# Patient Record
Sex: MALE | Race: WHITE | NOT HISPANIC OR LATINO
[De-identification: names, ages, dates, MRNs, and addresses within clinical notes are randomized per-mention and may not be internally consistent; named-entity substitution may affect disease eponyms.]

---

## 2017-09-20 ENCOUNTER — APPOINTMENT (OUTPATIENT)
Dept: ENDOCRINOLOGY | Facility: CLINIC | Age: 67
End: 2017-09-20
Payer: COMMERCIAL

## 2017-09-20 VITALS
TEMPERATURE: 97.9 F | DIASTOLIC BLOOD PRESSURE: 86 MMHG | OXYGEN SATURATION: 97 % | HEIGHT: 70 IN | BODY MASS INDEX: 25.62 KG/M2 | WEIGHT: 179 LBS | HEART RATE: 65 BPM | SYSTOLIC BLOOD PRESSURE: 142 MMHG

## 2017-09-20 DIAGNOSIS — E55.9 VITAMIN D DEFICIENCY, UNSPECIFIED: ICD-10-CM

## 2017-09-20 DIAGNOSIS — N23 UNSPECIFIED RENAL COLIC: ICD-10-CM

## 2017-09-20 DIAGNOSIS — D35.02 BENIGN NEOPLASM OF LEFT ADRENAL GLAND: ICD-10-CM

## 2017-09-20 PROCEDURE — 99214 OFFICE O/P EST MOD 30 MIN: CPT

## 2017-09-21 PROBLEM — E55.9 VITAMIN D DEFICIENCY: Status: ACTIVE | Noted: 2017-09-21

## 2017-09-21 PROBLEM — D35.02 ADRENAL CORTICAL ADENOMA OF LEFT ADRENAL GLAND: Status: ACTIVE | Noted: 2017-09-21

## 2017-09-21 PROBLEM — N23 RENAL COLIC ON RIGHT SIDE: Status: RESOLVED | Noted: 2017-09-21 | Resolved: 2017-09-21

## 2017-10-04 ENCOUNTER — APPOINTMENT (OUTPATIENT)
Dept: OTOLARYNGOLOGY | Facility: CLINIC | Age: 67
End: 2017-10-04
Payer: COMMERCIAL

## 2017-10-04 VITALS — OXYGEN SATURATION: 98 % | DIASTOLIC BLOOD PRESSURE: 77 MMHG | HEART RATE: 67 BPM | SYSTOLIC BLOOD PRESSURE: 136 MMHG

## 2017-10-04 PROCEDURE — 31575 DIAGNOSTIC LARYNGOSCOPY: CPT

## 2017-10-04 PROCEDURE — 92557 COMPREHENSIVE HEARING TEST: CPT

## 2017-10-04 PROCEDURE — 99214 OFFICE O/P EST MOD 30 MIN: CPT | Mod: 25

## 2017-10-04 PROCEDURE — 92550 TYMPANOMETRY & REFLEX THRESH: CPT

## 2017-10-04 PROCEDURE — 69210 REMOVE IMPACTED EAR WAX UNI: CPT

## 2017-10-25 ENCOUNTER — APPOINTMENT (OUTPATIENT)
Dept: OTOLARYNGOLOGY | Facility: CLINIC | Age: 67
End: 2017-10-25
Payer: COMMERCIAL

## 2017-10-25 VITALS
HEART RATE: 61 BPM | SYSTOLIC BLOOD PRESSURE: 149 MMHG | TEMPERATURE: 98.3 F | OXYGEN SATURATION: 97 % | DIASTOLIC BLOOD PRESSURE: 83 MMHG

## 2017-10-25 PROCEDURE — 99214 OFFICE O/P EST MOD 30 MIN: CPT

## 2017-11-08 ENCOUNTER — APPOINTMENT (OUTPATIENT)
Dept: OTOLARYNGOLOGY | Facility: CLINIC | Age: 67
End: 2017-11-08
Payer: COMMERCIAL

## 2017-11-08 VITALS
HEART RATE: 65 BPM | SYSTOLIC BLOOD PRESSURE: 151 MMHG | DIASTOLIC BLOOD PRESSURE: 88 MMHG | OXYGEN SATURATION: 98 % | TEMPERATURE: 98.4 F

## 2017-11-08 PROCEDURE — 99213 OFFICE O/P EST LOW 20 MIN: CPT

## 2017-12-01 ENCOUNTER — APPOINTMENT (OUTPATIENT)
Dept: OTOLARYNGOLOGY | Facility: CLINIC | Age: 67
End: 2017-12-01
Payer: COMMERCIAL

## 2017-12-01 VITALS
OXYGEN SATURATION: 99 % | HEART RATE: 64 BPM | DIASTOLIC BLOOD PRESSURE: 90 MMHG | TEMPERATURE: 98.2 F | SYSTOLIC BLOOD PRESSURE: 180 MMHG

## 2017-12-01 DIAGNOSIS — K21.9 ACUTE LARYNGITIS: ICD-10-CM

## 2017-12-01 DIAGNOSIS — R13.14 DYSPHAGIA, PHARYNGOESOPHAGEAL PHASE: ICD-10-CM

## 2017-12-01 DIAGNOSIS — J04.0 ACUTE LARYNGITIS: ICD-10-CM

## 2017-12-01 DIAGNOSIS — Z87.442 PERSONAL HISTORY OF URINARY CALCULI: ICD-10-CM

## 2017-12-01 PROCEDURE — 99214 OFFICE O/P EST MOD 30 MIN: CPT | Mod: 25

## 2017-12-01 PROCEDURE — 10022: CPT

## 2017-12-01 PROCEDURE — 76942 ECHO GUIDE FOR BIOPSY: CPT

## 2017-12-01 PROCEDURE — 31575 DIAGNOSTIC LARYNGOSCOPY: CPT

## 2017-12-04 LAB
25(OH)D3 SERPL-MCNC: 39.8 NG/ML
CALCIUM SERPL-MCNC: 9.5 MG/DL
PARATHYROID HORMONE INTACT: 41 PG/ML
T4 FREE SERPL-MCNC: 1 NG/DL
THYROGLOB AB SERPL-ACNC: <20 IU/ML
THYROPEROXIDASE AB SERPL IA-ACNC: 19.8 IU/ML
TSH SERPL-ACNC: 1.95 UIU/ML

## 2017-12-08 ENCOUNTER — MEDICATION RENEWAL (OUTPATIENT)
Age: 67
End: 2017-12-08

## 2018-01-02 ENCOUNTER — APPOINTMENT (OUTPATIENT)
Dept: OTOLARYNGOLOGY | Facility: CLINIC | Age: 68
End: 2018-01-02

## 2018-03-26 ENCOUNTER — APPOINTMENT (OUTPATIENT)
Dept: OTOLARYNGOLOGY | Facility: CLINIC | Age: 68
End: 2018-03-26
Payer: COMMERCIAL

## 2018-03-26 VITALS
TEMPERATURE: 98.4 F | HEART RATE: 76 BPM | SYSTOLIC BLOOD PRESSURE: 149 MMHG | OXYGEN SATURATION: 97 % | DIASTOLIC BLOOD PRESSURE: 82 MMHG

## 2018-03-26 PROCEDURE — 99214 OFFICE O/P EST MOD 30 MIN: CPT | Mod: 25

## 2018-03-26 PROCEDURE — 31575 DIAGNOSTIC LARYNGOSCOPY: CPT

## 2018-09-19 ENCOUNTER — APPOINTMENT (OUTPATIENT)
Dept: OTOLARYNGOLOGY | Facility: CLINIC | Age: 68
End: 2018-09-19
Payer: COMMERCIAL

## 2018-09-19 VITALS
DIASTOLIC BLOOD PRESSURE: 80 MMHG | RESPIRATION RATE: 17 BRPM | TEMPERATURE: 98.5 F | OXYGEN SATURATION: 98 % | SYSTOLIC BLOOD PRESSURE: 145 MMHG | HEART RATE: 68 BPM

## 2018-09-19 PROCEDURE — 92550 TYMPANOMETRY & REFLEX THRESH: CPT

## 2018-09-19 PROCEDURE — 69210 REMOVE IMPACTED EAR WAX UNI: CPT

## 2018-09-19 PROCEDURE — 99213 OFFICE O/P EST LOW 20 MIN: CPT | Mod: 25

## 2018-09-19 PROCEDURE — 92557 COMPREHENSIVE HEARING TEST: CPT

## 2018-09-26 ENCOUNTER — APPOINTMENT (OUTPATIENT)
Dept: ENDOCRINOLOGY | Facility: CLINIC | Age: 68
End: 2018-09-26
Payer: COMMERCIAL

## 2018-09-26 VITALS
DIASTOLIC BLOOD PRESSURE: 77 MMHG | HEART RATE: 81 BPM | HEIGHT: 70 IN | OXYGEN SATURATION: 97 % | SYSTOLIC BLOOD PRESSURE: 134 MMHG | BODY MASS INDEX: 25.34 KG/M2 | WEIGHT: 177 LBS | TEMPERATURE: 98.8 F

## 2018-09-26 DIAGNOSIS — R31.29 OTHER MICROSCOPIC HEMATURIA: ICD-10-CM

## 2018-09-26 PROCEDURE — 99214 OFFICE O/P EST MOD 30 MIN: CPT

## 2018-09-26 RX ORDER — OMEPRAZOLE 20 MG/1
20 CAPSULE, DELAYED RELEASE ORAL
Qty: 90 | Refills: 1 | Status: DISCONTINUED | COMMUNITY
Start: 2017-11-03 | End: 2018-09-26

## 2018-09-26 RX ORDER — OMEPRAZOLE 20 MG/1
20 CAPSULE, DELAYED RELEASE ORAL DAILY
Qty: 30 | Refills: 3 | Status: DISCONTINUED | COMMUNITY
Start: 2017-10-04 | End: 2018-09-26

## 2018-09-27 PROBLEM — R31.29 MICROSCOPIC HEMATURIA: Status: ACTIVE | Noted: 2018-09-26

## 2018-12-20 ENCOUNTER — TRANSCRIPTION ENCOUNTER (OUTPATIENT)
Age: 68
End: 2018-12-20

## 2018-12-20 ENCOUNTER — MEDICATION RENEWAL (OUTPATIENT)
Age: 68
End: 2018-12-20

## 2019-03-26 ENCOUNTER — TRANSCRIPTION ENCOUNTER (OUTPATIENT)
Age: 69
End: 2019-03-26

## 2019-04-23 ENCOUNTER — APPOINTMENT (OUTPATIENT)
Dept: OTOLARYNGOLOGY | Facility: CLINIC | Age: 69
End: 2019-04-23
Payer: COMMERCIAL

## 2019-04-23 VITALS
SYSTOLIC BLOOD PRESSURE: 139 MMHG | TEMPERATURE: 98.3 F | OXYGEN SATURATION: 98 % | DIASTOLIC BLOOD PRESSURE: 84 MMHG | HEART RATE: 68 BPM

## 2019-04-23 PROCEDURE — 31575 DIAGNOSTIC LARYNGOSCOPY: CPT

## 2019-04-23 PROCEDURE — 99214 OFFICE O/P EST MOD 30 MIN: CPT | Mod: 25

## 2019-04-23 NOTE — PROCEDURE
[Globus] : globus [Image(s) Captured] : image(s) captured and filed [Topical Lidocaine] : topical lidocaine [Flexible Endoscope] : examined with the flexible endoscope [Serial Number: ___] : Serial Number: [unfilled] [Same] : same as the Pre Op Dx. [Cerumen Impaction] : Cerumen Impaction [Unable to Cooperate with Mirror] : patient unable to cooperate with mirror [Gag Reflex] : gag reflex preventing mirror examination [de-identified] : The nasal septum is minimally deviated to the left with a spur. There are no masses or polyps and the nasal mucosa and secretions are normal. The choanae and posterior nasopharynx are normal without masses or drainage. The Eustachian tube orifices appear patent. The pharynx, including the posterior and lateral pharyngeal walls, the vallecula and base of tongue are normal without ulcerations, lesions or masses. The uvula is thickened and elongated, unchanged form last visit. The hypopharynx including the pyriform sinuses open well without pooling of secretions, mucosal lesions or masses. The supraglottic larynx including the epiglottis, petiole, glossoepiglottic folds and pharyngoepiglottic folds are normal without mucosal lesions, ulcerations or masses. The glottis reveals normal false vocal folds. The true vocal folds are glistening white, tense and of equal length, without paralysis, having symmetric mobility on adduction and abduction. There are no mucosal lesions, nodules, cysts, erythroplasia or leukoplakia. The posterior cricoid area has healthy pink mucosa in the interarytenoid area and esophageal inlet. There is mild thickening/edema of the interarytenoid mucosa suggestive of posterior laryngitis from laryngopharyngeal acid reflux disease. The trachea is clear in the immediate subglottic region without narrowing, deviation or lesions. \par  [de-identified] : snoring, GERD and thyroid nodules.  [FreeTextEntry1] : Bilateral hearing loss  [FreeTextEntry6] : copious cerumen AD removed with curettes and alligator forceps.

## 2019-04-23 NOTE — HISTORY OF PRESENT ILLNESS
[de-identified] : Ranjeet is a generally healthy 67-year-old male semi-retired  who was evaluated by his Otologist for complaints of globus sensation and hyperphlegmia with regurgitation and treated for GERD with omeprazole in October.  However, he was without improvement so a neck MRI was obtained 11/03/17.  This study revealed bilateral thyroid nodules including a solid left lobe 8 mm nodule and a solid 1 cm right lobe nodule not further characterized.  There is no known family history of thyroid disease with thyroid cancer.  He has no known radiation exposures in youth. Currently he denies shortness of breath, significant dysphagia, neck pain, neck pressure, or recent voice changes.  He has not had recent thyroid function studies obtained. His weight has generally been stable although slightly climbing over the years his appetite has been good. \par  [FreeTextEntry1] : Ranjeet returns today after he had a prior  [facility] FNA biopsy from a right mid-upper lobe complex nodule in December 2017.  The cytology was benign, [place] II with benign appearing small follicular cells and thin colloid.  Since then he has not had any new symptoms such as neck pain or pressure.  His weight has been stable and he is euthyroid. He denies dysphagia or recent voice changes.  He had a f/u neck ultrasound this month that revealed bilateral subcentimeter solid nodules.

## 2019-04-23 NOTE — REASON FOR VISIT
[Initial Evaluation] : an initial evaluation for [FreeTextEntry2] : multiple thyroid nodules on past MRI of neck for GERD and globus sensation. S/p FNAB right lobe nodule [FreeTextEntry1] : Referred by González Thomas MD,  PCP is Michael Knight MD

## 2019-04-23 NOTE — CONSULT LETTER
[Dear  ___] : Dear  [unfilled], [Consult Letter:] : I had the pleasure of evaluating your patient, [unfilled]. [Please see my note below.] : Please see my note below. [Sincerely,] : Sincerely, [Consult Closing:] : Thank you very much for allowing me to participate in the care of this patient.  If you have any questions, please do not hesitate to contact me. [Amando Vasquez M.D., FACS, FABIO] : Amando Vasquez M.D., FACS, [facility] [Director, [facility]] : Director, [facility] [[facility]] : [facility]  [[facility]] : [facility] [] :  [Certified in Neck Ultrasound/ ECNU & AIUM] : Certified in Neck Ultrasound/ ECNU & AIUM [Otolaryngology/Head & Neck Surgery] : Otolaryngology/Head & Neck Surgery [[facility]] : [facility]

## 2019-09-24 ENCOUNTER — APPOINTMENT (OUTPATIENT)
Dept: OTOLARYNGOLOGY | Facility: CLINIC | Age: 69
End: 2019-09-24
Payer: COMMERCIAL

## 2019-09-24 VITALS
OXYGEN SATURATION: 97 % | HEART RATE: 72 BPM | DIASTOLIC BLOOD PRESSURE: 76 MMHG | SYSTOLIC BLOOD PRESSURE: 137 MMHG | TEMPERATURE: 98.4 F

## 2019-09-24 PROCEDURE — 69210 REMOVE IMPACTED EAR WAX UNI: CPT

## 2019-09-24 PROCEDURE — 92557 COMPREHENSIVE HEARING TEST: CPT

## 2019-09-24 PROCEDURE — 99213 OFFICE O/P EST LOW 20 MIN: CPT | Mod: 25

## 2019-09-24 PROCEDURE — 92550 TYMPANOMETRY & REFLEX THRESH: CPT

## 2019-09-24 NOTE — HISTORY OF PRESENT ILLNESS
[de-identified] : followup 70 yo man with h/o b snhl has noticed progression of the loss and is ehre to check his ears. wished to discuss hearing aids. denies vertigo or significant tinnitus. does not use qtip. no pain. he feels hl is at the moderate level.

## 2019-09-24 NOTE — PROCEDURE
[Same] : same as the Pre Op Dx. [Cerumen Impaction] : Cerumen Impaction [] : Removal of Cerumen [FreeTextEntry6] : b cerumen removed\par

## 2019-09-24 NOTE — PHYSICAL EXAM
[Normal] : no masses and lesions seen, face is symmetric [de-identified] : b copious crumen impaction rmeoved atrauamtially with suciton

## 2019-11-15 ENCOUNTER — APPOINTMENT (OUTPATIENT)
Dept: ENDOCRINOLOGY | Facility: CLINIC | Age: 69
End: 2019-11-15
Payer: COMMERCIAL

## 2019-11-15 VITALS
WEIGHT: 174 LBS | OXYGEN SATURATION: 97 % | DIASTOLIC BLOOD PRESSURE: 60 MMHG | SYSTOLIC BLOOD PRESSURE: 138 MMHG | HEIGHT: 70 IN | HEART RATE: 77 BPM | BODY MASS INDEX: 24.91 KG/M2

## 2019-11-15 PROCEDURE — 99214 OFFICE O/P EST MOD 30 MIN: CPT

## 2019-12-13 ENCOUNTER — RX RENEWAL (OUTPATIENT)
Age: 69
End: 2019-12-13

## 2020-01-18 ENCOUNTER — TRANSCRIPTION ENCOUNTER (OUTPATIENT)
Age: 70
End: 2020-01-18

## 2020-01-21 ENCOUNTER — CLINICAL ADVICE (OUTPATIENT)
Age: 70
End: 2020-01-21

## 2020-03-24 ENCOUNTER — APPOINTMENT (OUTPATIENT)
Dept: OTOLARYNGOLOGY | Facility: CLINIC | Age: 70
End: 2020-03-24

## 2020-04-05 NOTE — REASON FOR VISIT
[Follow-Up: _____] : a [unfilled] follow-up visit [FreeTextEntry1] : hyperlipidemia, pre-diabetes and hypertension.

## 2020-04-05 NOTE — REVIEW OF SYSTEMS
[Hair Loss] : hair loss [Fatigue] : no fatigue [Decreased Appetite] : appetite not decreased [Fever] : no fever [Chills] : no chills [Blurry Vision] : no blurred vision [Dry Eyes] : no dryness of the eyes [Eyes Itch] : no itching of the eyes [Dysphagia] : no dysphagia [Chest Pain] : no chest pain [Palpitations] : no palpitations [Lower Ext Edema] : no lower extremity edema [Shortness Of Breath] : no shortness of breath [Wheezing] : no wheezing was heard [Cough] : no cough [SOB on Exertion] : no shortness of breath during exertion [Nausea] : no nausea [Constipation] : no constipation [Diarrhea] : no diarrhea [Heartburn] : no heartburn [Polyuria] : no polyuria [Dysuria] : no dysuria [Joint Pain] : no joint pain [Joint Stiffness] : no joint stiffness [Muscle Cramps] : no muscle cramps [Myalgia] : no myalgia  [Dry Skin] : no dry skin [Headache] : no headaches [Tremors] : no tremors [Pain/Numbness of Digits] : no pain/numbness of digits [Difficulty Walking] : no difficulty walking [Depression] : no depression [Anxiety] : no anxiety [Insomnia] : no insomnia [Stress] : no stress [Polydipsia] : no polydipsia [Cold Intolerance] : cold tolerant [Heat Intolerance] : heat tolerant [Easy Bleeding] : no ~M tendency for easy bleeding [Easy Bruising] : no tendency for easy bruising [FreeTextEntry2] : Has lost 3 lb in the past year [FreeTextEntry3] : Occasional "floaters" in both eyes [FreeTextEntry4] : See comments in the HPI re: hearing deficit [FreeTextEntry8] : Nocturia 2-3X/night.  No recent flank pain

## 2020-04-05 NOTE — DATA REVIEWED
[FreeTextEntry1] : [facility]  (11/5/19)\par \par ,  A1c 5.4%\par CMP WNL\par Urine microalb ratio negative at 6.0  (normal < 30)\par LDL 93, HDL 34, TG 64\par TSH level normal at 2.72 uU/ml  (0.4-4.5)\par 25-D level in target range at 37 ng/ml

## 2020-04-05 NOTE — PHYSICAL EXAM
[Alert] : alert [Healthy Appearance] : healthy appearance [Well Nourished] : well nourished [PERRL] : pupils equal, round and reactive to light [Normal Sclera/Conjunctiva] : normal sclera/conjunctiva [No Proptosis] : no proptosis [EOMI] : extra ocular movement intact [No Lid Lag] : no lid lag [Normal Outer Ear/Nose] : the ears and nose were normal in appearance [No Neck Mass] : no neck mass was observed [Thyroid Not Enlarged] : the thyroid was not enlarged [No LAD] : no lymphadenopathy [Clear to Auscultation] : lungs were clear to auscultation bilaterally [Normal Rate] : heart rate was normal  [Regular Rhythm] : with a regular rhythm [Carotids Normal] : carotid pulses were normal with no bruits [No Edema] : there was no peripheral edema [Not Tender] : non-tender [Soft] : abdomen soft [No HSM] : no hepato-splenomegaly [Normal] : normal and non tender [No CVA Tenderness] : no ~M costovertebral angle tenderness [Normal Gait] : normal gait [No Joint Swelling] : no joint swelling seen [Normal Strength/Tone] : muscle strength and tone were normal [No Involuntary Movements] : no involuntary movements were seen [No Tremors] : no tremors [Normal Sensation on Monofilament Testing] : normal sensation on monofilament testing of lower extremities [Normal Affect] : the affect was normal [Normal Mood] : the mood was normal [Gynecomastia] : no gynecomastia [Kyphosis] : no kyphosis present [Foot Ulcers] : no foot ulcers [Acanthosis Nigricans] : no acanthosis nigricans [de-identified] : No corneal arcus or xanthelasma [de-identified] : Hearing is mildly decreased [de-identified] : No distinct thyroid nodules are palpable [de-identified] : Short TIMUR at the LSB and aortic area [de-identified] : DP pulses 2+ bilaterally [de-identified] : No cervical or supraclavicular adenopathy [de-identified] : Moderate male-pattern hair loss [de-identified] : Vibratory sensation significantly decreased over the toes

## 2020-04-05 NOTE — ASSESSMENT
[FreeTextEntry1] : 1) Hyperlipidemia:  LDL-cholesterol is below the target of 100 mg% for a pt with IFG/pre-diabetes.\par --Continue atorvastatin 40 mg/day\par \par 2) Pre-diabetes:  FBS is just into the impaired fasting glucose range, but the A1c level is below the threshold for pre-diabetes.\par --Continue current diet, which leaves little room for additional modification\par \par 3) Hypertension:  BP is excellent on today's exam, though readings at home occasionally show slightly elevated systolics.\par --Continue home monitoring.  He is to let me know if the frequency of elevated systolics increases.  (Will likely add amlodipine as the next step)\par --Continue lisinopril and HCTZ\par --Consider obtaining echocardiogram\par \par 4) Multinodular goiter:  Ultrasound in April of this year showed multiple sub-centimeter nodules (all < 6 mm in largest dimension), some with cystic components, and none with suspicious sonographic characteristics.  All were stable compared to the study from a year earlier except for one new 3 mm lesion in the left lobe.  \par TFTs are WNL.\par --Continue to follow with serial ultrasounds\par \par Pt has not had Pneumovax--to do this at local pharmacy\par See for follow-up in 1 year.  CMP, lipids, A1c, 25-D, TFTs and CBC before the visit [FreeTextEntry2] : Diet, BP targets

## 2020-04-05 NOTE — HISTORY OF PRESENT ILLNESS
[FreeTextEntry1] : 69-year-old man who is followed for hypercholesterolemia, hypertension and pre-diabetes.  His other active medical problems include BPH and a recently discovered multinodular goiter.  His past medical history is significant for nephrolithiasis and for an incidentally discovered left adrenal nodule (which was non-secretory by hormonal testing and stable in size on follow-up imaging). \par \par He now returns after a hiatus of a year.\par Interim history since his last visit:\par --Has transferred his urologic care to Dr. Sorensen.  Is still on finasteride, and his PSA level has been approximately 5-6.  Had a repeat prostate MRI a few days ago because of a minor abnormality seen two years ago.  His urinary stream is still somewhat slow, and he was started on tamsulosin 2-3 weeks ago with mild improvement.  He has nocturia X 2-3, but no undue frequency during the day.\par --Saw Dr. Vasquez for follow-up of his thyroid nodules, which were stable\par --Continues to have periodic audiology exams, which are stable.  He was told that he is a "candidate" for a hearing aid, but he does not feel that he needs one at this point.\par \par Has been checking his BPs at home, but only once a month.  He is unsure about the accuracy of his machine, but it an Omron device with a biceps cuff, which he seems to be using correctly.  Most of his systolics are at or slightly above 140.  His diastolics are all below 80.\par He has lost a few lb in weight.\par Current diet:\par --Breakfast is cereal (Shredded Wheat and Bran) with skim milk.  Occasionally adds fruit, and will sometimes have hot cereal in the winter.\par --Lunch is either a salad or sandwich\par --Protein is beef once a week, chicken 2-3X/week, fish twice a week.  Starch varies--more pasta than before, otherwise potato, rice or quinoa.  \par --Intake of baked goods is minimal  .

## 2020-06-11 ENCOUNTER — TRANSCRIPTION ENCOUNTER (OUTPATIENT)
Age: 70
End: 2020-06-11

## 2020-07-01 ENCOUNTER — RX RENEWAL (OUTPATIENT)
Age: 70
End: 2020-07-01

## 2020-08-20 ENCOUNTER — APPOINTMENT (OUTPATIENT)
Dept: PHARMACY | Facility: CLINIC | Age: 70
End: 2020-08-20
Payer: SELF-PAY

## 2020-08-20 ENCOUNTER — APPOINTMENT (OUTPATIENT)
Dept: OTOLARYNGOLOGY | Facility: CLINIC | Age: 70
End: 2020-08-20
Payer: COMMERCIAL

## 2020-08-20 VITALS
TEMPERATURE: 97.8 F | SYSTOLIC BLOOD PRESSURE: 147 MMHG | DIASTOLIC BLOOD PRESSURE: 79 MMHG | HEART RATE: 75 BPM | OXYGEN SATURATION: 98 %

## 2020-08-20 PROCEDURE — V5010 ASSESSMENT FOR HEARING AID: CPT

## 2020-08-20 PROCEDURE — 92557 COMPREHENSIVE HEARING TEST: CPT

## 2020-08-20 PROCEDURE — G0268 REMOVAL OF IMPACTED WAX MD: CPT

## 2020-08-20 PROCEDURE — 99213 OFFICE O/P EST LOW 20 MIN: CPT | Mod: 25

## 2020-08-20 PROCEDURE — 92550 TYMPANOMETRY & REFLEX THRESH: CPT

## 2020-08-20 NOTE — PHYSICAL EXAM
[Midline] : trachea located in midline position [Normal] : no masses and lesions seen, face is symmetric [de-identified] : bilateral EAC with cerumen impaction, removed atraumatically with suction

## 2020-08-20 NOTE — PROCEDURE
[Risk and Benefits Discussed] : The purpose, risks, discomforts, benefits and alternatives of the procedure have been explained to the patient including no treatment. [Cerumen Impaction] : Cerumen Impaction [Same] : same as the Pre Op Dx. [] : Removal of Cerumen [FreeTextEntry1] : see subjective [FreeTextEntry2] : bilateral [FreeTextEntry6] : b copious cerumen removed atraumatically with suction

## 2020-08-20 NOTE — REASON FOR VISIT
[Subsequent Evaluation] : a subsequent evaluation for [FreeTextEntry2] : cerumen impaction and hearing loss

## 2020-08-20 NOTE — ASSESSMENT
[FreeTextEntry1] : 70M who returns with worsened hearing loss bilaterally and cerumen impaction, removed in office. \par \par Plan:\par -  & HAE -  no significant change but he is ready for HA => hae today, questions answered\par - f/u with me in 6 months and as needed

## 2020-08-20 NOTE — HISTORY OF PRESENT ILLNESS
[de-identified] : 70M who returns with continued worsening of his bilateral hearing. He admits that his family complains more that he cannot hear and is ready to discuss hearing aids. He notices that he needs to make the television louder than he used to. He admits to intermittent rare nonpulsatile tinnitus that lasts for just a few seconds bilaterally. He denies any otalgia, otorrhea, vertigo. No other ENT complaints.

## 2020-10-23 ENCOUNTER — APPOINTMENT (OUTPATIENT)
Dept: PHARMACY | Facility: CLINIC | Age: 70
End: 2020-10-23
Payer: SELF-PAY

## 2020-10-23 ENCOUNTER — APPOINTMENT (OUTPATIENT)
Dept: ENDOCRINOLOGY | Facility: CLINIC | Age: 70
End: 2020-10-23
Payer: COMMERCIAL

## 2020-10-23 ENCOUNTER — APPOINTMENT (OUTPATIENT)
Dept: OTOLARYNGOLOGY | Facility: CLINIC | Age: 70
End: 2020-10-23

## 2020-10-23 VITALS
HEIGHT: 69.29 IN | WEIGHT: 165 LBS | SYSTOLIC BLOOD PRESSURE: 125 MMHG | OXYGEN SATURATION: 98 % | DIASTOLIC BLOOD PRESSURE: 71 MMHG | BODY MASS INDEX: 24.16 KG/M2 | TEMPERATURE: 97.5 F | HEART RATE: 66 BPM

## 2020-10-23 DIAGNOSIS — Z86.69 PERSONAL HISTORY OF OTHER DISEASES OF THE NERVOUS SYSTEM AND SENSE ORGANS: ICD-10-CM

## 2020-10-23 DIAGNOSIS — H61.23 IMPACTED CERUMEN, BILATERAL: ICD-10-CM

## 2020-10-23 PROCEDURE — 99214 OFFICE O/P EST MOD 30 MIN: CPT | Mod: 25

## 2020-10-23 PROCEDURE — V5261F: CUSTOM

## 2020-10-23 PROCEDURE — 99072 ADDL SUPL MATRL&STAF TM PHE: CPT

## 2020-10-24 NOTE — DATA REVIEWED
[FreeTextEntry1] : [facility]  (10/15/20)\par \par FBS 95,  A1c 5.2%\par CMP and CBC WNL\par LDL 99, HDL 44, TG 55\par TSH level normal at 2.29 uU/ml  (0.4-4.5)\par B-12 level in target range at 891 pg/ml\par 25-D level excellent at 45 ng/ml

## 2020-10-24 NOTE — REVIEW OF SYSTEMS
[Dry Eyes] : dryness [Hair Loss] : hair loss [Fatigue] : no fatigue [Decreased Appetite] : appetite not decreased [Fever] : no fever [Chills] : no chills [Blurred Vision] : no blurred vision [Eyes Itch] : no itch [Dysphagia] : no dysphagia [Chest Pain] : no chest pain [Palpitations] : no palpitations [Lower Ext Edema] : no lower extremity edema [Shortness Of Breath] : no shortness of breath [Cough] : no cough [Wheezing] : no wheezing [SOB on Exertion] : no shortness of breath on exertion [Nausea] : no nausea [Constipation] : no constipation [Heartburn] : no heartburn [Diarrhea] : no diarrhea [Polyuria] : no polyuria [Dysuria] : no dysuria [Joint Pain] : no joint pain [Muscle Weakness] : no muscle weakness [Myalgia] : no myalgia  [Muscle Cramps] : no muscle cramps [Dry Skin] : no dry skin [Ulcer] : no ulcer [Headaches] : no headaches [Tremors] : no tremors [Pain/Numbness of Digits] : no pain/numbness of digits [Depression] : no depression [Insomnia] : no insomnia [Anxiety] : no anxiety [Stress] : no stress [Polydipsia] : no polydipsia [Cold Intolerance] : no cold intolerance [Heat Intolerance] : no heat intolerance [Easy Bleeding] : no ~M tendency for easy bleeding [Easy Bruising] : no tendency for easy bruising [FreeTextEntry2] : Has lost 9 lb since his last visit a year ago [FreeTextEntry4] : Has had an excellent result from the hearing aids [FreeTextEntry8] : Nocturia 3X/night.  No flank pain or hematuria

## 2020-10-24 NOTE — PHYSICAL EXAM
[Alert] : alert [Well Nourished] : well nourished [Healthy Appearance] : healthy appearance [Normal Sclera/Conjunctiva] : normal sclera/conjunctiva [EOMI] : extra ocular movement intact [PERRL] : pupils equal, round and reactive to light [No Proptosis] : no proptosis [No Lid Lag] : no lid lag [Normal Outer Ear/Nose] : the ears and nose were normal in appearance [No Neck Mass] : no neck mass was observed [No LAD] : no lymphadenopathy [Thyroid Not Enlarged] : the thyroid was not enlarged [Clear to Auscultation] : lungs were clear to auscultation bilaterally [Normal Rate] : heart rate was normal [Regular Rhythm] : with a regular rhythm [Carotids Normal] : carotid pulses were normal with no bruits [No Edema] : no peripheral edema [Not Tender] : non-tender [Soft] : abdomen soft [No HSM] : no hepato-splenomegaly [Normal Supraclavicular Nodes] : no supraclavicular lymphadenopathy [Normal Anterior Cervical Nodes] : no anterior cervical lymphadenopathy [No CVA Tenderness] : no ~M costovertebral angle tenderness [Normal Gait] : normal gait [No Involuntary Movements] : no involuntary movements were seen [No Joint Swelling] : no joint swelling seen [Normal Strength/Tone] : muscle strength and tone were normal [No Rash] : no rash [No Tremors] : no tremors [Normal Sensation on Monofilament Testing] : normal sensation on monofilament testing of lower extremities [Normal Affect] : the affect was normal [Normal Mood] : the mood was normal [Kyphosis] : no kyphosis present [Acanthosis Nigricans] : no acanthosis nigricans [Foot Ulcers] : no foot ulcers [de-identified] : No corneal arcus or xanthelasma [de-identified] : Hearing is essentially normal with hearing aids in place [de-identified] : Unable to feel a distinct thyroid nodule [de-identified] : Very short mid-systolic murmur at the LSB [de-identified] : DP pulses 3+ bilaterally [de-identified] : Significant male-pattern hair loss [de-identified] : Vibratory sensation slightly decreased over the toes

## 2020-10-24 NOTE — HISTORY OF PRESENT ILLNESS
[FreeTextEntry1] : 70-year-old man who is followed for hypercholesterolemia, hypertension and pre-diabetes.  His other active medical problems include BPH and a multinodular goiter.  His past medical history is significant for nephrolithiasis and for an incidentally discovered left adrenal nodule (which was non-secretory by hormonal testing and stable in size on follow-up imaging). \par \par He now returns after a hiatus of a year.\manny Has not had any significant medical issues in the past year, and has no new physical complaints.\manny Has been "sheltering" at his [place] house in [place].\manny Has lost 9 lb in weight, was only "semi-aware" of this--noted that his jeans were somewhat looser.  Says that he has been eating "healthier," and has also been more active since getting a dog. \manny Finally decided to get hearing aids.  Is using "loaners" at present, and will be getting her permanent ones this afternoon.\par BPs checked at home have been mostly 120-130/65-75\par Current diet:\par --Breakfast is usually Shredded Wheat with skim milk, sometimes with a banana. Has one large mug of black coffee\par --Lunch is either leftovers or half a sandwich.  Sometimes has avocado toast\par --Protein at supper is fish or chicken.  Starch (if he has it) is rice or corn\par --Intake of cake or cookies is minimal\manny Is up to date with his urologist Dr. Sorensen.  PSA has been stable--(last PSA was 4.8 in August)\manny Received a Prevnar vaccination in March, and a flu shot last month.\par

## 2020-10-24 NOTE — ASSESSMENT
[FreeTextEntry1] : 1) Hypercholesterolemia:  LDL-cholesterol is just below his target of 100 mg%.\par --Continue atorvastatin 40 mg/day\par \par 2) Pre-diabetes:  FBS and A1c level are now below the IFG/pre-diabetes range.  His diet is excellent, his physical activity has increased, and he has lost 9 lb in weight.\par --Continue diet and exercise\par \par 3) Multinodular goiter:  Thyroid gland is normal in size and without any discretely palpable nodularity.  HIs most recent ultrasound showed three nodules in each lobe, most < 1 cm in size, and none with any suspicious sonographic characteristics.  A 1 cm nodule in the R lobe (now smaller) was negative on biopsy in 2017.  HIs current TFTs are normal.\par --Continue to follow with yearly TFTs\par --Defer a follow-up ultrasound until next year\par \par 4) Hypertension:  BP is excellent at today's visit and his readings checked at home have been in the optimal range.   His control has definitely improved since the addition of HCTZ.\par --Continue lisinopril and HCTZ\par --Continue home monitoring\par \par See for follow-up in 1 year.  CMP, lipids, TFTs, A1c, CBC before the visit.

## 2020-11-19 ENCOUNTER — APPOINTMENT (OUTPATIENT)
Dept: ENDOCRINOLOGY | Facility: CLINIC | Age: 70
End: 2020-11-19

## 2021-02-19 ENCOUNTER — NON-APPOINTMENT (OUTPATIENT)
Age: 71
End: 2021-02-19

## 2021-03-08 ENCOUNTER — NON-APPOINTMENT (OUTPATIENT)
Age: 71
End: 2021-03-08

## 2021-03-15 ENCOUNTER — APPOINTMENT (OUTPATIENT)
Dept: PHARMACY | Facility: CLINIC | Age: 71
End: 2021-03-15
Payer: COMMERCIAL

## 2021-03-15 ENCOUNTER — APPOINTMENT (OUTPATIENT)
Dept: UROLOGY | Facility: CLINIC | Age: 71
End: 2021-03-15
Payer: COMMERCIAL

## 2021-03-15 ENCOUNTER — APPOINTMENT (OUTPATIENT)
Dept: OTOLARYNGOLOGY | Facility: CLINIC | Age: 71
End: 2021-03-15
Payer: COMMERCIAL

## 2021-03-15 VITALS
OXYGEN SATURATION: 99 % | HEART RATE: 66 BPM | SYSTOLIC BLOOD PRESSURE: 144 MMHG | DIASTOLIC BLOOD PRESSURE: 78 MMHG | TEMPERATURE: 98.1 F

## 2021-03-15 VITALS
TEMPERATURE: 97.7 F | WEIGHT: 165 LBS | BODY MASS INDEX: 23.62 KG/M2 | DIASTOLIC BLOOD PRESSURE: 79 MMHG | SYSTOLIC BLOOD PRESSURE: 155 MMHG | HEART RATE: 81 BPM | HEIGHT: 70 IN

## 2021-03-15 DIAGNOSIS — Z00.00 ENCOUNTER FOR GENERAL ADULT MEDICAL EXAMINATION W/OUT ABNORMAL FINDINGS: ICD-10-CM

## 2021-03-15 LAB
BILIRUB UR QL STRIP: NORMAL
CLARITY UR: CLEAR
COLLECTION METHOD: NORMAL
GLUCOSE UR-MCNC: NORMAL
HCG UR QL: 0.2 EU/DL
HGB UR QL STRIP.AUTO: NORMAL
KETONES UR-MCNC: NORMAL
LEUKOCYTE ESTERASE UR QL STRIP: NORMAL
NITRITE UR QL STRIP: NORMAL
PH UR STRIP: 6.5
PROT UR STRIP-MCNC: NORMAL
SP GR UR STRIP: 1.02

## 2021-03-15 PROCEDURE — 81003 URINALYSIS AUTO W/O SCOPE: CPT | Mod: QW

## 2021-03-15 PROCEDURE — 99072 ADDL SUPL MATRL&STAF TM PHE: CPT

## 2021-03-15 PROCEDURE — 99204 OFFICE O/P NEW MOD 45 MIN: CPT

## 2021-03-15 PROCEDURE — 92557 COMPREHENSIVE HEARING TEST: CPT

## 2021-03-15 PROCEDURE — 99213 OFFICE O/P EST LOW 20 MIN: CPT | Mod: 25

## 2021-03-15 PROCEDURE — 92550 TYMPANOMETRY & REFLEX THRESH: CPT

## 2021-03-15 PROCEDURE — V5299A: CUSTOM | Mod: NC

## 2021-03-15 NOTE — ASSESSMENT
[FreeTextEntry1] : 69 yo male with hx of fluctuating PSA.  HIs PSA is lower than it was in August (adjusted for finasteride it is 7.2).  He had a prostate MR ni late 2019 which showed to concerning lesions.  He would like to continue with PA surveillance for now.  HE understands that there is a chance he may be harboring prostate cancer despite the clinical information we have.  There has been no significant change in his PSA over the last couple of years and therefor it is reasonable to continue with PSA surveillance.  I did suggest a repeat MR which he is amenable to, but would like to wait till later in the year.  \par \par Lower urinary symptoms were reviewed including the potential etiologies of the patient's symptoms. The anatomy of the urinary tract was reviewed. Bladder, prostate, and urethral sources, as well as non-urologic sources, were discussed. Options for evaluation were discussed including cystoscopy, urodynamics, and pelvis ultrasonography. Management of symptoms were reviewed including no therapy, medical therapy, and surgical therapy. The long term sequelae of no treatment, including no adverse effects, potential for progressive lower urinary tract symptoms or deterioration, urinary retention, bladder dysfunction, and renal dysfunction were reviewed. \par Medical therapy for BPH (benign prostatic hyperplasia), or prostate enlargement, was reviewed including the physiology of medication therapy. Alpha blocker medication therapy was reviewed including adverse effects (including dizziness, hypotension, retrograde ejaculation, rhinitis, fatigue), proper usage, and contraindications. The use of 5 alpha reductase inhibitor medication therapy was reviewed including adverse effects (including decreased libido, erectile dysfunction, fatigue, reduction of PSA level), proper usage, and contraindications. Potential increased risk of high grade prostate malignancy was reviewed with 5 alpha reductase inhibitor medication. Combination medication therapy with alpha blocker and 5 alpha reductase inhibitor therapy was reviewed. Surgical options for BPH were discussed including Rezum, Urolift, laser therapies, TURP, and simple prostatectomy. \par \par He is content to continue with medical therapy as he has been doing.  \par

## 2021-03-15 NOTE — ASSESSMENT
[FreeTextEntry1] : cerumen removed\par ears felt better\par  reviewed with pt and compared with that from 2019 - no change\par reassured\par rtc 9 mo or when needed

## 2021-03-15 NOTE — PHYSICAL EXAM
[General Appearance - Well Developed] : well developed [Normal Appearance] : normal appearance [General Appearance - In No Acute Distress] : no acute distress [Heart Rate And Rhythm] : Heart rate and rhythm were normal [] : no respiratory distress [Abdomen Soft] : soft [Abdomen Hernia] : no hernia was discovered [Costovertebral Angle Tenderness] : no ~M costovertebral angle tenderness [Penis Abnormality] : normal uncircumcised penis [Epididymis] : the epididymides were normal [Testes Tenderness] : no tenderness of the testes [Testes Mass (___cm)] : there were no testicular masses [Prostate Tenderness] : the prostate was not tender [No Prostate Nodules] : no prostate nodules [Prostate Size ___ (0-4)] : prostate size [unfilled] (scale: 0-4) [Normal Station and Gait] : the gait and station were normal for the patient's age [Skin Color & Pigmentation] : normal skin color and pigmentation [No Focal Deficits] : no focal deficits [Oriented To Time, Place, And Person] : oriented to person, place, and time

## 2021-03-15 NOTE — PHYSICAL EXAM
[Normal] : tympanic membranes are normal in both ears [de-identified] : b copious cerumen impaction removed atraumatically with suction

## 2021-03-15 NOTE — HISTORY OF PRESENT ILLNESS
[de-identified] : followup 69 yo man with b u-shaped snhl has gotten hearing aids and is happy with them . He has noticed decrease in hearing b over the past week or two- and is here to check his ears. No inciting event. no pain or drainage. hl is moderate.

## 2021-03-15 NOTE — HISTORY OF PRESENT ILLNESS
[FreeTextEntry1] : 69 yo male with hx of fluctuating PSA. He had a negative TRUS biopsy m,any years ago (> 10 years).  He has been on finasteride for several years.  PSA was 4.8 in August 2020.  It has been as high as 5.86 in January 2020 with a 4K score of 10%.  He has 2 MRIs of the prostate , one in 2016 and one in 2019.  In 2016 he had a PIRADs 3 lesion, but he did not want to proceed with biopsy at that time.  The repeat MRI in 2019 did not show the same PIRADs 3 lesion. A separate lesion was seen which was scored as PIRADs 2.  The prostate is enlarged at 96 g per the 2019 MR.  The finasteride and tamsulosin has been helping with his LUTS.  He is content to continue with those medications.\par \par His most recent PSA from March 2021 is 3.8 with % Free PSA of 8%.

## 2021-08-02 ENCOUNTER — NON-APPOINTMENT (OUTPATIENT)
Age: 71
End: 2021-08-02

## 2021-08-04 LAB
PSA FREE FLD-MCNC: 10 %
PSA FREE SERPL-MCNC: 0.5 NG/ML
PSA SERPL-MCNC: 4.98 NG/ML

## 2021-08-06 ENCOUNTER — APPOINTMENT (OUTPATIENT)
Dept: UROLOGY | Facility: CLINIC | Age: 71
End: 2021-08-06
Payer: COMMERCIAL

## 2021-08-06 PROCEDURE — 99213 OFFICE O/P EST LOW 20 MIN: CPT | Mod: 95

## 2021-08-06 NOTE — ASSESSMENT
[FreeTextEntry1] : 70 yo audrey with elevated PSA and BPH.  We again discussed options including repeat MRI vs continued PSA surveillance.  His PSA has been higher in the past than it is now.  He prefers to recheck a PSA in 3 months.  If the PSA is more elevated then we will get another prostate MR.  Patient agrees with plan.

## 2021-08-06 NOTE — HISTORY OF PRESENT ILLNESS
[FreeTextEntry1] : 3/15/21:\par 71 yo male with hx of fluctuating PSA. He had a negative TRUS biopsy m,any years ago (> 10 years).  He has been on finasteride for several years.  PSA was 4.8 in August 2020.  It has been as high as 5.86 in January 2020 with a 4K score of 10%.  He has 2 MRIs of the prostate , one in 2016 and one in 2019.  In 2016 he had a PIRADs 3 lesion, but he did not want to proceed with biopsy at that time.  The repeat MRI in 2019 did not show the same PIRADs 3 lesion. A separate lesion was seen which was scored as PIRADs 2.  The prostate is enlarged at 96 g per the 2019 MR.  The finasteride and tamsulosin has been helping with his LUTS.  He is content to continue with those medications.\par \par His most recent PSA from March 2021 is 3.8 with % Free PSA of 8%.\par \par *************************\par \par \par I contacted Mr. LYNDA MONTANA by telemedicine using the [employer] platform. The patient was located at his  home address in [place].  The appropriate consent was obtained prior to the conference.  The primary purpose of the meeting was to discuss his genitourinary symptoms.\par \par The patient-doctor relationship has been established in a face to face fashion via real time video/audio HIPAA compliant communication using telemedicine software. The patient's identity has been confirmed. The patient was previously emailed a copy of the telemedicine consent. They have had a chance to review and has now given verbal consent and has requested care to be assessed and treated through telemedicine and understands there maybe limitations in this process and they may need further follow up care in the office and or hospital settings.\par   .\par \par 8/6/21:\par Patient returns for follow up.  His PSA from 8/2/21 is 4.98.  It has been as high as 5.8.  With a hx of 1 prior negative TRUS Bx and an MRI from 2019 showing no PIRADs 3-5 lesions.  He is voiding at his baseline.  He continues to take finasteride and tamsulosin.

## 2021-09-24 ENCOUNTER — APPOINTMENT (OUTPATIENT)
Dept: OTOLARYNGOLOGY | Facility: CLINIC | Age: 71
End: 2021-09-24
Payer: COMMERCIAL

## 2021-09-24 VITALS
DIASTOLIC BLOOD PRESSURE: 75 MMHG | HEIGHT: 70 IN | SYSTOLIC BLOOD PRESSURE: 118 MMHG | WEIGHT: 165 LBS | TEMPERATURE: 98.2 F | BODY MASS INDEX: 23.62 KG/M2 | OXYGEN SATURATION: 97 % | HEART RATE: 67 BPM

## 2021-09-24 PROCEDURE — G0268 REMOVAL OF IMPACTED WAX MD: CPT

## 2021-09-24 PROCEDURE — 69210 REMOVE IMPACTED EAR WAX UNI: CPT

## 2021-09-24 PROCEDURE — V5299A: CUSTOM | Mod: NC

## 2021-09-24 PROCEDURE — 92550 TYMPANOMETRY & REFLEX THRESH: CPT

## 2021-09-24 PROCEDURE — 99213 OFFICE O/P EST LOW 20 MIN: CPT | Mod: 25

## 2021-09-24 PROCEDURE — 92557 COMPREHENSIVE HEARING TEST: CPT

## 2021-09-24 NOTE — PHYSICAL EXAM
[de-identified] : b copious cerumen impaction removed atraumatically with suction [Normal] : assessment of respiratory effort is normal [de-identified] : gait steady

## 2021-09-24 NOTE — ASSESSMENT
[FreeTextEntry1] : cerumen removed\par ears felt better\par  no change- reviewed with pt and reassured\par rtc 6 mo

## 2021-09-24 NOTE — HISTORY OF PRESENT ILLNESS
[de-identified] : 6 mo followup appt for this 72 yo man with h/o b u-shaped snhl and is a ha user. He feels b ears are moderately blocked with b decreased hearing. No pain or drainage. not a qtip user.

## 2021-10-18 ENCOUNTER — APPOINTMENT (OUTPATIENT)
Dept: ENDOCRINOLOGY | Facility: CLINIC | Age: 71
End: 2021-10-18
Payer: COMMERCIAL

## 2021-10-18 VITALS
HEART RATE: 69 BPM | OXYGEN SATURATION: 99 % | WEIGHT: 164 LBS | TEMPERATURE: 97.7 F | HEIGHT: 70 IN | BODY MASS INDEX: 23.48 KG/M2 | DIASTOLIC BLOOD PRESSURE: 75 MMHG | SYSTOLIC BLOOD PRESSURE: 123 MMHG

## 2021-10-18 PROCEDURE — 99214 OFFICE O/P EST MOD 30 MIN: CPT

## 2021-10-23 NOTE — HISTORY OF PRESENT ILLNESS
[FreeTextEntry1] : 71-year-old man who is followed for hypercholesterolemia, hypertension and pre-diabetes.  His other active medical problems include BPH and a multinodular goiter.  His past medical history is significant for nephrolithiasis and for an incidentally discovered left adrenal nodule (which was non-secretory by hormonal testing and stable in size on follow-up imaging). \par \par He now returns for his yearly visit.\manny Has obtained hearing aids, and is very happy with them.\manny Has also followed up with Dr. Sorensen regarding his elevated PSA, which has remained stable.  He will likely have a follow-up MRI of the prostate at the end of this year.\par He has no new physical complaints.  \manny Has been splitting his time between [place] and his country house in [facility]\manny Received his (Moderna) COVID vaccine in Feb/March.  Had only mild fatigue after the second dose.  Intends to get the booster when it is approved.\par He has also received his flu shot and both pneumonia vaccines, as well as both doses of the Shingrix vaccine..\par His weight is about the same.\par Continues to check his BPs at home, and nearly all are < 130-140/80.\par Current diet was reviewed:\par --Breakfast is usually Shredded Wheat with LF milk and either banana or berries.\par --Lunch is a sandwich\par --Protein at supper is beef once a week, fish 2-3X/week, otherwise poultry.  Starches are brown rice, WW pasta or potato.\par --Will have an occasional muffin or Ugandan in the morning

## 2021-10-23 NOTE — DATA REVIEWED
[FreeTextEntry1] : [facility]  (9/28/21)\par \par FBS 97,  A1c 5.1%\par CMP and CBC WNL\par LDL 78, HDL 42, TG 60\par TSH level normal at 2.5 uU/ml (0.4-2.5)\par Urinalysis negative except for 6-10 WBC

## 2021-10-23 NOTE — PHYSICAL EXAM
[Alert] : alert [Well Nourished] : well nourished [Healthy Appearance] : healthy appearance [Normal Sclera/Conjunctiva] : normal sclera/conjunctiva [EOMI] : extra ocular movement intact [PERRL] : pupils equal, round and reactive to light [No Proptosis] : no proptosis [No Lid Lag] : no lid lag [Normal Outer Ear/Nose] : the ears and nose were normal in appearance [No Neck Mass] : no neck mass was observed [No LAD] : no lymphadenopathy [Thyroid Not Enlarged] : the thyroid was not enlarged [Clear to Auscultation] : lungs were clear to auscultation bilaterally [Normal Rate] : heart rate was normal [Regular Rhythm] : with a regular rhythm [Carotids Normal] : carotid pulses were normal with no bruits [No Edema] : no peripheral edema [Not Tender] : non-tender [Soft] : abdomen soft [No HSM] : no hepato-splenomegaly [Normal Supraclavicular Nodes] : no supraclavicular lymphadenopathy [Normal Anterior Cervical Nodes] : no anterior cervical lymphadenopathy [No CVA Tenderness] : no ~M costovertebral angle tenderness [Normal Gait] : normal gait [No Involuntary Movements] : no involuntary movements were seen [No Joint Swelling] : no joint swelling seen [Normal Strength/Tone] : muscle strength and tone were normal [No Rash] : no rash [No Tremors] : no tremors [Normal Sensation on Monofilament Testing] : normal sensation on monofilament testing of lower extremities [Normal Affect] : the affect was normal [Normal Mood] : the mood was normal [Normal Hearing] : hearing was normal [Kyphosis] : no kyphosis present [Acanthosis Nigricans] : no acanthosis nigricans [Foot Ulcers] : no foot ulcers [de-identified] : No corneal arcus or xanthelasma [de-identified] : Soft TIMUR at the LSB and aortic area [de-identified] : Unable to feel a distinct thyroid nodule [de-identified] : DP and PT pulses 3+ bilaterally [de-identified] : Significant male-pattern hair loss [de-identified] : Vibratory sensation moderately decreased over the toes

## 2021-10-23 NOTE — ASSESSMENT
[FreeTextEntry1] : 1) Hypercholesterolemia:  LDL-cholesterol level is well below his target of 100 mg%.\par --Continue atorvastatin 40 mg/day\par \par 2) Pre-diabetes:  FBS and A1c level are below the IFG/pre-diabetes range.\par --Continue his current diet\par \par 3) Hypertension:  BP is excellent both at today's visit and on his readings checked at home.\par --Continue combination therapy with lisinopril and HCTZ\par \par 4) Systolic murmur:  Seems slightly louder than last year.  ?? reflects aortic sclerosis\par --To obtain an echocardiogram\par \par 5) Multinodular goiter:  HIs most recent ultrasound in 2019 showed 6 nodules, 3 in each lobe.  The only nodule which was > 1 cm in size had been previously biopsied in 2017 with Basalt category 2 cytology.  None of the sub-centimeter nodules showed any suspicious sonographic characteristics.\par His TFTs are in the euthyroid range\par --He has an appointment to see Dr. Vasquez for follow-up in early November\par \par Pt to call back to discuss the results of the echocardiogram.\par See for follow-up in 1 year.  CMP, lipids, A1c, TFTs, CBC, U/A before the visit [FreeTextEntry2] : Diet, BP targets

## 2021-10-23 NOTE — REVIEW OF SYSTEMS
[Dry Eyes] : dryness [Hair Loss] : hair loss [Nocturia] : nocturia [Fatigue] : no fatigue [Decreased Appetite] : appetite not decreased [Recent Weight Gain (___ Lbs)] : no recent weight gain [Recent Weight Loss (___ Lbs)] : no recent weight loss [Fever] : no fever [Chills] : no chills [Blurred Vision] : no blurred vision [Eyes Itch] : no itch [Dysphagia] : no dysphagia [Chest Pain] : no chest pain [Palpitations] : no palpitations [Lower Ext Edema] : no lower extremity edema [Shortness Of Breath] : no shortness of breath [Cough] : no cough [Wheezing] : no wheezing [SOB on Exertion] : no shortness of breath on exertion [Nausea] : no nausea [Constipation] : no constipation [Heartburn] : no heartburn [Diarrhea] : no diarrhea [Polyuria] : no polyuria [Dysuria] : no dysuria [Joint Pain] : no joint pain [Muscle Weakness] : no muscle weakness [Myalgia] : no myalgia  [Joint Stiffness] : no joint stiffness [Muscle Cramps] : no muscle cramps [Dry Skin] : no dry skin [Ulcer] : no ulcer [Headaches] : no headaches [Tremors] : no tremors [Pain/Numbness of Digits] : no pain/numbness of digits [Depression] : no depression [Insomnia] : no insomnia [Anxiety] : no anxiety [Stress] : no stress [Polydipsia] : no polydipsia [Cold Intolerance] : no cold intolerance [Heat Intolerance] : no heat intolerance [Easy Bleeding] : no ~M tendency for easy bleeding [Easy Bruising] : no tendency for easy bruising [FreeTextEntry4] : Has had an excellent result from the hearing aids [FreeTextEntry8] : No flank pain or hematuria

## 2021-10-28 ENCOUNTER — APPOINTMENT (OUTPATIENT)
Dept: HEART AND VASCULAR | Facility: CLINIC | Age: 71
End: 2021-10-28
Payer: COMMERCIAL

## 2021-10-28 PROCEDURE — 93306 TTE W/DOPPLER COMPLETE: CPT

## 2021-11-01 ENCOUNTER — APPOINTMENT (OUTPATIENT)
Dept: OTOLARYNGOLOGY | Facility: CLINIC | Age: 71
End: 2021-11-01
Payer: COMMERCIAL

## 2021-11-01 VITALS
DIASTOLIC BLOOD PRESSURE: 78 MMHG | HEART RATE: 72 BPM | TEMPERATURE: 98.1 F | OXYGEN SATURATION: 98 % | SYSTOLIC BLOOD PRESSURE: 147 MMHG

## 2021-11-01 DIAGNOSIS — Z87.898 PERSONAL HISTORY OF OTHER SPECIFIED CONDITIONS: ICD-10-CM

## 2021-11-01 PROCEDURE — 31575 DIAGNOSTIC LARYNGOSCOPY: CPT

## 2021-11-01 PROCEDURE — 99214 OFFICE O/P EST MOD 30 MIN: CPT | Mod: 25

## 2021-11-01 NOTE — HISTORY OF PRESENT ILLNESS
[de-identified] : Ranjeet is a generally healthy 67-year-old male semi-retired  who was evaluated by his Otologist for complaints of globus sensation and hyperphlegmia with regurgitation and treated for GERD with omeprazole in October.  However, he was without improvement so a neck MRI was obtained 11/03/17.  This study revealed bilateral thyroid nodules including a solid left lobe 8 mm nodule and a solid 1 cm right lobe nodule not further characterized.  There is no known family history of thyroid disease with thyroid cancer.  He has no known radiation exposures in youth. Currently he denies shortness of breath, significant dysphagia, neck pain, neck pressure, or recent voice changes.  He has not had recent thyroid function studies obtained. His weight has generally been stable although slightly climbing over the years his appetite has been good. \par  [FreeTextEntry1] : Ranjeet returns today after he had a prior USG FNA biopsy from a right mid-upper lobe complex nodule in December 2017.  The cytology was benign, Danese II with benign appearing small follicular cells and thin colloid.  Since then he has not had any new symptoms such as neck pain or pressure.  His weight has been stable with slight weight loss and he is euthyroid. He denies dysphagia or recent voice changes.  He had a f/u neck ultrasound last month that revealed bilateral subcentimeter solid nodules.  There are two new nodules not previously described in the right lobe upper pole measuring 4 mm in greatest dimension and in the left lower pole measuring 4 mm in greatest dimension.  Neither of the new nodules are suspicious for malignancy.  The remaining nodules are stable. There is no lymphadenopathy.

## 2021-11-01 NOTE — CONSULT LETTER
[Dear  ___] : Dear  [unfilled], [Consult Letter:] : I had the pleasure of evaluating your patient, [unfilled]. [Please see my note below.] : Please see my note below. [Consult Closing:] : Thank you very much for allowing me to participate in the care of this patient.  If you have any questions, please do not hesitate to contact me. [Sincerely,] : Sincerely, [Amando Vasquez M.D., FACS, FABIO] : Amando Vasquez M.D., FACS, [facility] [Director, [facility]] : Director, [facility] [[facility]] : [facility] [[facility]] : [facility]  [Certified in Neck Ultrasound/ ECNU & AIUM] : Certified in Neck Ultrasound/ ECNU & AIUM [] :  [Otolaryngology/Head & Neck Surgery] : Otolaryngology/Head & Neck Surgery [[facility]] : [facility] [FreeTextEntry3] : \par Amando Vasquez M.D., FACS, ECNU\par Director Center for Thyroid & Parathyroid Surgery\par [facility] [place] [facility]\par Certified in Thyroid/Parathyroid/Neck Ultrasound, ECNU/ AIUM\par \par , Department of Otolaryngology\par [facility]\par

## 2021-11-01 NOTE — PROCEDURE
[Image(s) Captured] : image(s) captured and filed [Unable to Cooperate with Mirror] : patient unable to cooperate with mirror [Gag Reflex] : gag reflex preventing mirror examination [Topical Lidocaine] : topical lidocaine [Flexible Endoscope] : examined with the flexible endoscope [Serial Number: ___] : Serial Number: [unfilled] [Oxymetazoline HCl] : oxymetazoline HCl [de-identified] : The nasal septum is minimally deviated to the left with bilateral anterior spurs. There are no masses or polyps and the nasal mucosa and secretions are normal. The choanae and posterior nasopharynx are normal without masses or drainage. The Eustachian tube orifices appear patent. The pharynx, including the posterior and lateral pharyngeal walls, the vallecula and base of tongue are normal without ulcerations, lesions or masses. The uvula is thickened and elongated, unchanged form last visit. The hypopharynx including the pyriform sinuses open well without pooling of secretions, mucosal lesions or masses. The supraglottic larynx including the epiglottis, petiole, glossoepiglottic folds and pharyngoepiglottic folds are normal without mucosal lesions, ulcerations or masses. The glottis reveals normal false vocal folds. The true vocal folds are glistening white, tense and of equal length, without paralysis, having symmetric mobility on adduction and abduction. There are no mucosal lesions, nodules, cysts, erythroplasia or leukoplakia. The posterior cricoid area has healthy pink mucosa in the interarytenoid area and esophageal inlet. There is minimal thickening/edema of the interarytenoid mucosa suggestive of posterior laryngitis from laryngopharyngeal acid reflux disease. The trachea is clear in the immediate subglottic region without narrowing, deviation or lesions. \par  [de-identified] : snoring, GERD and new thyroid nodules.

## 2021-11-03 ENCOUNTER — RX RENEWAL (OUTPATIENT)
Age: 71
End: 2021-11-03

## 2021-11-09 ENCOUNTER — NON-APPOINTMENT (OUTPATIENT)
Age: 71
End: 2021-11-09

## 2021-11-09 DIAGNOSIS — I35.8 OTHER NONRHEUMATIC AORTIC VALVE DISORDERS: ICD-10-CM

## 2022-03-01 ENCOUNTER — NON-APPOINTMENT (OUTPATIENT)
Age: 72
End: 2022-03-01

## 2022-03-02 LAB
PSA FREE FLD-MCNC: 7 %
PSA FREE SERPL-MCNC: 0.35 NG/ML
PSA SERPL-MCNC: 4.83 NG/ML

## 2022-03-11 ENCOUNTER — APPOINTMENT (OUTPATIENT)
Dept: UROLOGY | Facility: CLINIC | Age: 72
End: 2022-03-11
Payer: COMMERCIAL

## 2022-03-11 VITALS — SYSTOLIC BLOOD PRESSURE: 136 MMHG | HEART RATE: 69 BPM | TEMPERATURE: 97.7 F | DIASTOLIC BLOOD PRESSURE: 69 MMHG

## 2022-03-11 DIAGNOSIS — N52.9 MALE ERECTILE DYSFUNCTION, UNSPECIFIED: ICD-10-CM

## 2022-03-11 LAB
BILIRUB UR QL STRIP: NEGATIVE
CLARITY UR: CLEAR
COLLECTION METHOD: NORMAL
GLUCOSE UR-MCNC: NEGATIVE
HCG UR QL: 0.2 EU/DL
HGB UR QL STRIP.AUTO: NEGATIVE
KETONES UR-MCNC: NEGATIVE
LEUKOCYTE ESTERASE UR QL STRIP: NEGATIVE
NITRITE UR QL STRIP: NEGATIVE
PH UR STRIP: 7
PROT UR STRIP-MCNC: NEGATIVE
SP GR UR STRIP: 1.02

## 2022-03-11 PROCEDURE — 81003 URINALYSIS AUTO W/O SCOPE: CPT | Mod: QW

## 2022-03-11 PROCEDURE — 51798 US URINE CAPACITY MEASURE: CPT

## 2022-03-11 PROCEDURE — 99213 OFFICE O/P EST LOW 20 MIN: CPT

## 2022-03-11 NOTE — PHYSICAL EXAM
[General Appearance - Well Developed] : well developed [Normal Appearance] : normal appearance [Abdomen Soft] : soft [Abdomen Tenderness] : non-tender [Urethral Meatus] : meatus normal [Epididymis] : the epididymides were normal [Testes Tenderness] : no tenderness of the testes [Testes Mass (___cm)] : there were no testicular masses [Prostate Tenderness] : the prostate was not tender [No Prostate Nodules] : no prostate nodules [Prostate Size ___ (0-4)] : prostate size [unfilled] (scale: 0-4) [Skin Color & Pigmentation] : normal skin color and pigmentation [Heart Rate And Rhythm] : Heart rate and rhythm were normal [] : no respiratory distress [Oriented To Time, Place, And Person] : oriented to person, place, and time [Normal Station and Gait] : the gait and station were normal for the patient's age [Penis Abnormality] : normal uncircumcised penis [No Focal Deficits] : no focal deficits

## 2022-03-11 NOTE — ASSESSMENT
[FreeTextEntry1] : 72 yo male with hx of pelevated PSA with one negative TRUS biopsy 10 years ago and 2 prostate MRIs that do nopt show any suspicious lesions.  His PSA has been stable for the last few years.  His most recent PSA was 4.8.  We discussed options including continued PSA surveillance with another prostate MRI if the PSA starts rising.  The patient is interested in the this approach.  \par \par Plan:\par 1. PSA in 6 months\par 2. RTC after # 1\par 3. Will refill rx for sildenafil

## 2022-03-11 NOTE — HISTORY OF PRESENT ILLNESS
[FreeTextEntry1] : 3/15/21:\par 71 yo male with hx of fluctuating PSA. He had a negative TRUS biopsy many years ago (> 10 years).  He has been on finasteride for several years.  PSA was 4.8 in August 2020.  It has been as high as 5.86 in January 2020 with a 4K score of 10%.  He has 2 MRIs of the prostate , one in 2016 and one in 2019.  In 2016 he had a PIRADs 3 lesion, but he did not want to proceed with biopsy at that time.  The repeat MRI in 2019 did not show the same PIRADs 3 lesion. A separate lesion was seen which was scored as PIRADs 2.  The prostate is enlarged at 96 g per the 2019 MR.  The finasteride and tamsulosin has been helping with his LUTS.  He is content to continue with those medications.\par \par His most recent PSA from March 2021 is 3.8 with % Free PSA of 8%.\par \par *************************\par \par \par I contacted Mr. LYNDA MONTANA by telemedicine using the [employer] platform. The patient was located at his  home address in [place].  The appropriate consent was obtained prior to the conference.  The primary purpose of the meeting was to discuss his genitourinary symptoms.\par \par The patient-doctor relationship has been established in a face to face fashion via real time video/audio HIPAA compliant communication using telemedicine software. The patient's identity has been confirmed. The patient was previously emailed a copy of the telemedicine consent. They have had a chance to review and has now given verbal consent and has requested care to be assessed and treated through telemedicine and understands there maybe limitations in this process and they may need further follow up care in the office and or hospital settings.\par   .\par \par 8/6/21:\par Patient returns for follow up.  His PSA from 8/2/21 is 4.98.  It has been as high as 5.8.  With a hx of 1 prior negative TRUS Bx and an MRI from 2019 showing no PIRADs 3-5 lesions.  He is voiding at his baseline.  He continues to take finasteride and tamsulosin.  \par \par *****************\par 3/11/22:\par PAtient returns for follow up.  His PSA is slightly lower than it was in August.  He has an MRI from 2019 showing no PIRADs 3-5 lesions.  HIs PSA has been stable.  He continues to take tamsulosin and BPH daily for his LUTS.  \par \par PSA (3/1/22): 4.83\par \par PVR = 8 cc

## 2022-03-24 ENCOUNTER — APPOINTMENT (OUTPATIENT)
Dept: PHARMACY | Facility: CLINIC | Age: 72
End: 2022-03-24
Payer: COMMERCIAL

## 2022-03-24 ENCOUNTER — APPOINTMENT (OUTPATIENT)
Dept: OTOLARYNGOLOGY | Facility: CLINIC | Age: 72
End: 2022-03-24
Payer: COMMERCIAL

## 2022-03-24 ENCOUNTER — APPOINTMENT (OUTPATIENT)
Dept: PHARMACY | Facility: CLINIC | Age: 72
End: 2022-03-24
Payer: SELF-PAY

## 2022-03-24 VITALS
BODY MASS INDEX: 23.62 KG/M2 | TEMPERATURE: 98 F | HEIGHT: 70 IN | WEIGHT: 165 LBS | HEART RATE: 65 BPM | SYSTOLIC BLOOD PRESSURE: 129 MMHG | OXYGEN SATURATION: 96 % | DIASTOLIC BLOOD PRESSURE: 59 MMHG

## 2022-03-24 PROCEDURE — G0268 REMOVAL OF IMPACTED WAX MD: CPT

## 2022-03-24 PROCEDURE — 69210 REMOVE IMPACTED EAR WAX UNI: CPT

## 2022-03-24 PROCEDURE — 99213 OFFICE O/P EST LOW 20 MIN: CPT | Mod: 25

## 2022-03-24 PROCEDURE — 92557 COMPREHENSIVE HEARING TEST: CPT

## 2022-03-24 PROCEDURE — 92550 TYMPANOMETRY & REFLEX THRESH: CPT

## 2022-03-24 PROCEDURE — V5299A: CUSTOM | Mod: NC

## 2022-03-24 NOTE — HISTORY OF PRESENT ILLNESS
[de-identified] : 6 month followup for this 70 y/o M with h/o b u-shaped snhl and a HA user. He feels both ears are plugged for a few weeks and is here to have ears checked. He denies otalgia or drainage. He does not use Q tips. No other ENT complaints at this time.

## 2022-03-24 NOTE — ASSESSMENT
[FreeTextEntry1] : b cerumen impaction\par -cerumen removed\par -ears feel better\par -avoid Q tips\par - showed b u-shaped snhl no change from 6 months ago- reviewed with pt and reassured; ha adjustment recommended as needed\par RTC in 6 months

## 2022-03-24 NOTE — PHYSICAL EXAM
[Normal] : no rashes [de-identified] : b copious cerumen removed atraumatically with suction, TMs intact [de-identified] : gait steady

## 2022-03-24 NOTE — PROCEDURE
[Cerumen Impaction] : Cerumen Impaction [Same] : same as the Pre Op Dx. [] : Removal of Cerumen [FreeTextEntry5] : b copious cerumen removed atraumatically with suction, TMs intact

## 2022-04-11 PROBLEM — J04.0 REFLUX LARYNGITIS: Status: ACTIVE | Noted: 2017-10-04

## 2022-05-03 ENCOUNTER — RX RENEWAL (OUTPATIENT)
Age: 72
End: 2022-05-03

## 2022-07-11 ENCOUNTER — RX RENEWAL (OUTPATIENT)
Age: 72
End: 2022-07-11

## 2022-08-08 ENCOUNTER — NON-APPOINTMENT (OUTPATIENT)
Age: 72
End: 2022-08-08

## 2022-09-16 ENCOUNTER — APPOINTMENT (OUTPATIENT)
Dept: PHARMACY | Facility: CLINIC | Age: 72
End: 2022-09-16

## 2022-09-16 ENCOUNTER — APPOINTMENT (OUTPATIENT)
Dept: OTOLARYNGOLOGY | Facility: CLINIC | Age: 72
End: 2022-09-16

## 2022-09-16 VITALS
SYSTOLIC BLOOD PRESSURE: 141 MMHG | DIASTOLIC BLOOD PRESSURE: 73 MMHG | WEIGHT: 165 LBS | TEMPERATURE: 97.7 F | HEIGHT: 70 IN | BODY MASS INDEX: 23.62 KG/M2 | OXYGEN SATURATION: 98 % | RESPIRATION RATE: 16 BRPM | HEART RATE: 65 BPM

## 2022-09-16 PROCEDURE — 99212 OFFICE O/P EST SF 10 MIN: CPT

## 2022-09-16 PROCEDURE — V5299A: CUSTOM | Mod: NC

## 2022-09-16 NOTE — PHYSICAL EXAM
[de-identified] : b copious cerumen impaction removed atraumatically with suciton [Normal] : assessment of respiratory effort is normal

## 2022-09-16 NOTE — ASSESSMENT
[FreeTextEntry1] : cerumen removed\par ears felt better\par brought to audiology for ha check\par rtc 6 mo with  and as needed

## 2022-09-16 NOTE — HISTORY OF PRESENT ILLNESS
[de-identified] : 6 mo follow up appt for this 71 yo M with h/o b snhl - he is a hearing aid user. He notices his hearing has been  and his ears have felt blocked for the past few weeks. No inciting event.

## 2022-10-17 ENCOUNTER — APPOINTMENT (OUTPATIENT)
Dept: ENDOCRINOLOGY | Facility: CLINIC | Age: 72
End: 2022-10-17

## 2022-10-17 VITALS
SYSTOLIC BLOOD PRESSURE: 125 MMHG | WEIGHT: 167 LBS | RESPIRATION RATE: 16 BRPM | HEIGHT: 70 IN | BODY MASS INDEX: 23.91 KG/M2 | DIASTOLIC BLOOD PRESSURE: 65 MMHG | HEART RATE: 74 BPM | OXYGEN SATURATION: 98 % | TEMPERATURE: 97.3 F

## 2022-10-17 PROCEDURE — 99214 OFFICE O/P EST MOD 30 MIN: CPT

## 2022-10-23 NOTE — REVIEW OF SYSTEMS
[Dry Eyes] : dryness [Nocturia] : nocturia [Fatigue] : no fatigue [Decreased Appetite] : appetite not decreased [Fever] : no fever [Chills] : no chills [Blurred Vision] : no blurred vision [Eyes Itch] : no itch [Dysphagia] : no dysphagia [Chest Pain] : no chest pain [Palpitations] : no palpitations [Lower Ext Edema] : no lower extremity edema [Shortness Of Breath] : no shortness of breath [Cough] : no cough [Wheezing] : no wheezing [SOB on Exertion] : no shortness of breath on exertion [Nausea] : no nausea [Constipation] : no constipation [Heartburn] : no heartburn [Diarrhea] : no diarrhea [Polyuria] : no polyuria [Dysuria] : no dysuria [Joint Pain] : no joint pain [Muscle Weakness] : no muscle weakness [Myalgia] : no myalgia  [Joint Stiffness] : no joint stiffness [Muscle Cramps] : no muscle cramps [Dry Skin] : no dry skin [Hair Loss] : no hair loss [Ulcer] : no ulcer [Headaches] : no headaches [Tremors] : no tremors [Pain/Numbness of Digits] : no pain/numbness of digits [Depression] : no depression [Insomnia] : no insomnia [Anxiety] : no anxiety [Stress] : no stress [Polydipsia] : no polydipsia [Cold Intolerance] : no cold intolerance [Heat Intolerance] : no heat intolerance [Easy Bleeding] : no ~M tendency for easy bleeding [Easy Bruising] : no tendency for easy bruising [FreeTextEntry2] : Has gained 3 lb since his last visit  [FreeTextEntry4] : Has had an excellent result from the hearing aids, but needs to have his ears cleaned every 6 months [FreeTextEntry8] : No flank pain or hematuria. Has nocturia 2-3X/night.  Last colonoscopy was 6 years ago--is due for next study in 2023

## 2022-10-23 NOTE — PHYSICAL EXAM
[Alert] : alert [Well Nourished] : well nourished [Healthy Appearance] : healthy appearance [Normal Sclera/Conjunctiva] : normal sclera/conjunctiva [EOMI] : extra ocular movement intact [PERRL] : pupils equal, round and reactive to light [No Proptosis] : no proptosis [No Lid Lag] : no lid lag [Normal Outer Ear/Nose] : the ears and nose were normal in appearance [No Neck Mass] : no neck mass was observed [No LAD] : no lymphadenopathy [Thyroid Not Enlarged] : the thyroid was not enlarged [Clear to Auscultation] : lungs were clear to auscultation bilaterally [Normal Rate] : heart rate was normal [Regular Rhythm] : with a regular rhythm [Carotids Normal] : carotid pulses were normal with no bruits [No Edema] : no peripheral edema [Not Tender] : non-tender [Soft] : abdomen soft [No HSM] : no hepato-splenomegaly [Normal Supraclavicular Nodes] : no supraclavicular lymphadenopathy [Normal Anterior Cervical Nodes] : no anterior cervical lymphadenopathy [No CVA Tenderness] : no ~M costovertebral angle tenderness [Normal Gait] : normal gait [No Involuntary Movements] : no involuntary movements were seen [No Joint Swelling] : no joint swelling seen [Normal Strength/Tone] : muscle strength and tone were normal [No Rash] : no rash [No Tremors] : no tremors [Normal Sensation on Monofilament Testing] : normal sensation on monofilament testing of lower extremities [Normal Affect] : the affect was normal [Normal Mood] : the mood was normal [Kyphosis] : no kyphosis present [Acanthosis Nigricans] : no acanthosis nigricans [Foot Ulcers] : no foot ulcers [de-identified] : No corneal arcus or xanthelasma [de-identified] : Hearing is somewhat decreased even with the hearing aids [de-identified] : Unable to feel a distinct thyroid nodule [de-identified] : Soft TIMUR at the LSB and aortic area--barely audible today [de-identified] : DP and PT pulses 2+ bilaterally [de-identified] : Moderate male-pattern hair loss [de-identified] : Vibratory sensation mildly decreased over the toes

## 2022-10-23 NOTE — ASSESSMENT
[FreeTextEntry1] : Hypercholesterolemia:  LDL-cholesterol level is slgihtly above target and higher than many of his recent values, but blood test was done less than a week after he returned from vacation

## 2022-10-23 NOTE — HISTORY OF PRESENT ILLNESS
[FreeTextEntry1] : 71-year-old man who is followed for hypercholesterolemia, hypertension and pre-diabetes.  His other active medical problems include BPH and a multinodular goiter.  His past medical history is significant for nephrolithiasis and for an incidentally discovered left adrenal nodule (which was non-secretory by hormonal testing and stable in size on follow-up imaging). \par \par He now returns for his yearly visit.\par Interim history is significant only for a mild case of COVID last month--had mild nasal congestion and sore throat after returning from a trip. to [place].  Rapid test was negative, but he had a positive PCR.  He did not take Paxlovid.  \par Still sees William Ham and the audiologist every 3-6 months to have cerumen removed.  Hearing aids have not been changed, but are working satisfactorily\par BPs checked at home have been < 140/75\par Medications reviewed:  No recent changed.\par Current diet:\par --Breakfast is cold cereal (usually Shredded Wheat or Wheatabix) with LF milk.  Has a small glass of OJ\par --Lunch is a salad or sandwich\par --Protein at supper is beef once a week, fish 1-2X/week, otherwise chicken.  Starches at supper are usually pasta or rice.  Diet has has been adversely affected by the lack of gas in his building.  \par --Intake of baked goods is minimal\par \par \par \manny Has obtained hearing aids, and is very happy with them.\manny Has also followed up with Dr. Sorensen regarding his elevated PSA, which has remained stable.  He will likely have a follow-up MRI of the prostate at the end of this year.\par He has no new physical complaints.  \manny Has been splitting his time between [place] and his country house in [facility]\manny Received his (Moderna) COVID vaccine in Feb/March.  Had only mild fatigue after the second dose.  Intends to get the booster when it is approved.\par He has also received his flu shot and both pneumonia vaccines, as well as both doses of the Shingrix vaccine..\par His weight is about the same.\par Continues to check his BPs at home, and nearly all are < 130-140/80.\par Current diet was reviewed:\par --Breakfast is usually Shredded Wheat with LF milk and either banana or berries.\par --Lunch is a sandwich\par --Protein at supper is beef once a week, fish 2-3X/week, otherwise poultry.  Starches are brown rice, WW pasta or potato.\par --Will have an occasional muffin or German in the morning

## 2022-11-17 LAB — PSA SERPL-MCNC: 5.17 NG/ML

## 2022-11-30 ENCOUNTER — APPOINTMENT (OUTPATIENT)
Dept: UROLOGY | Facility: CLINIC | Age: 72
End: 2022-11-30

## 2022-11-30 LAB
BILIRUB UR QL STRIP: NORMAL
CLARITY UR: CLEAR
COLLECTION METHOD: NORMAL
GLUCOSE UR-MCNC: NORMAL
HCG UR QL: 0.2 EU/DL
HGB UR QL STRIP.AUTO: NORMAL
KETONES UR-MCNC: NORMAL
LEUKOCYTE ESTERASE UR QL STRIP: NORMAL
NITRITE UR QL STRIP: NORMAL
PH UR STRIP: 7
PROT UR STRIP-MCNC: NORMAL
SP GR UR STRIP: 1.02

## 2022-11-30 PROCEDURE — 99213 OFFICE O/P EST LOW 20 MIN: CPT

## 2022-11-30 PROCEDURE — 81003 URINALYSIS AUTO W/O SCOPE: CPT | Mod: QW

## 2022-11-30 NOTE — ASSESSMENT
[FreeTextEntry1] : 71 yo male with elevated PSA.  We will proceed with a prostate MRI.  HIs last imaging was 3 years ago.  If the MRI shows no concerning lesions then we will continue with PSA screening.\par \par Plan:\par 1. Prostate MRI\par 2. F/u after # 1

## 2022-11-30 NOTE — PHYSICAL EXAM
[General Appearance - Well Developed] : well developed [Normal Appearance] : normal appearance [Abdomen Soft] : soft [Abdomen Tenderness] : non-tender [Abdomen Hernia] : no hernia was discovered [Urethral Meatus] : meatus normal [Penis Abnormality] : normal uncircumcised penis [Epididymis] : the epididymides were normal [Testes Tenderness] : no tenderness of the testes [Testes Mass (___cm)] : there were no testicular masses [Skin Color & Pigmentation] : normal skin color and pigmentation [Heart Rate And Rhythm] : Heart rate and rhythm were normal [] : no respiratory distress [Oriented To Time, Place, And Person] : oriented to person, place, and time [Normal Station and Gait] : the gait and station were normal for the patient's age [No Focal Deficits] : no focal deficits

## 2022-11-30 NOTE — HISTORY OF PRESENT ILLNESS
[FreeTextEntry1] : 3/15/21:\par 69 yo male with hx of fluctuating PSA. He had a negative TRUS biopsy many years ago (> 10 years).  He has been on finasteride for several years.  PSA was 4.8 in August 2020.  It has been as high as 5.86 in January 2020 with a 4K score of 10%.  He has 2 MRIs of the prostate , one in 2016 and one in 2019.  In 2016 he had a PIRADs 3 lesion, but he did not want to proceed with biopsy at that time.  The repeat MRI in 2019 did not show the same PIRADs 3 lesion. A separate lesion was seen which was scored as PIRADs 2.  The prostate is enlarged at 96 g per the 2019 MR.  The finasteride and tamsulosin has been helping with his LUTS.  He is content to continue with those medications.\par \par His most recent PSA from March 2021 is 3.8 with % Free PSA of 8%.\par \par *************************\par \par \par I contacted Mr. LYNDA MONTANA by telemedicine using the [employer] platform. The patient was located at his  home address in [place].  The appropriate consent was obtained prior to the conference.  The primary purpose of the meeting was to discuss his genitourinary symptoms.\par \par The patient-doctor relationship has been established in a face to face fashion via real time video/audio HIPAA compliant communication using telemedicine software. The patient's identity has been confirmed. The patient was previously emailed a copy of the telemedicine consent. They have had a chance to review and has now given verbal consent and has requested care to be assessed and treated through telemedicine and understands there maybe limitations in this process and they may need further follow up care in the office and or hospital settings.\par   .\par \par 8/6/21:\par Patient returns for follow up.  His PSA from 8/2/21 is 4.98.  It has been as high as 5.8.  With a hx of 1 prior negative TRUS Bx and an MRI from 2019 showing no PIRADs 3-5 lesions.  He is voiding at his baseline.  He continues to take finasteride and tamsulosin.  \par \par *****************\par 3/11/22:\par Patient returns for follow up.  His PSA is slightly lower than it was in August.  He has an MRI from 2019 showing no PIRADs 3-5 lesions.  HIs PSA has been stable.  He continues to take tamsulosin and BPH daily for his LUTS.  \par \par PSA (3/1/22): 4.83\par \par PVR = 8 cc\par \par ***************\par 11/30/22:\par Patient returns for follow up.  He is voiding at his baseline and his happy.  HIs PSA has been elevated for several years but stable.  He is s/p one negative TRUS biopsy in the past.  His last prostate MRI was in 2019. \par \par PSA (11/15/22) = 5.17

## 2022-12-28 ENCOUNTER — RX RENEWAL (OUTPATIENT)
Age: 72
End: 2022-12-28

## 2023-03-01 ENCOUNTER — APPOINTMENT (OUTPATIENT)
Dept: PHARMACY | Facility: CLINIC | Age: 73
End: 2023-03-01
Payer: SELF-PAY

## 2023-03-01 ENCOUNTER — APPOINTMENT (OUTPATIENT)
Dept: OTOLARYNGOLOGY | Facility: CLINIC | Age: 73
End: 2023-03-01
Payer: COMMERCIAL

## 2023-03-01 VITALS
HEART RATE: 68 BPM | HEIGHT: 70 IN | RESPIRATION RATE: 17 BRPM | WEIGHT: 170 LBS | OXYGEN SATURATION: 99 % | DIASTOLIC BLOOD PRESSURE: 78 MMHG | SYSTOLIC BLOOD PRESSURE: 159 MMHG | TEMPERATURE: 97.1 F | BODY MASS INDEX: 24.34 KG/M2

## 2023-03-01 DIAGNOSIS — H90.3 SENSORINEURAL HEARING LOSS, BILATERAL: ICD-10-CM

## 2023-03-01 PROCEDURE — 99213 OFFICE O/P EST LOW 20 MIN: CPT | Mod: 25

## 2023-03-01 PROCEDURE — 92557 COMPREHENSIVE HEARING TEST: CPT

## 2023-03-01 PROCEDURE — V5299A: CUSTOM | Mod: NC

## 2023-03-01 PROCEDURE — 92550 TYMPANOMETRY & REFLEX THRESH: CPT | Mod: 52

## 2023-03-01 PROCEDURE — 69210 REMOVE IMPACTED EAR WAX UNI: CPT

## 2023-03-01 NOTE — HISTORY OF PRESENT ILLNESS
[de-identified] : 6.5 month followup visit for this 73 y/o M with h/o b snhl. He is a ha user and has u-shaped hl. He feels b ears are full and hearing is midly decreased for a few weeks.

## 2023-03-01 NOTE — PHYSICAL EXAM
[de-identified] : b copious cerumen impaction removed atraumatically with suction [Normal] : no nystagmus [de-identified] : gait steady

## 2023-03-01 NOTE — ASSESSMENT
[FreeTextEntry1] : cerumen removed\par ears felt better\par snhl - no significant change but ha's adjusted by audiology for his comfort\par rtc 6 mo

## 2023-04-17 ENCOUNTER — TRANSCRIPTION ENCOUNTER (OUTPATIENT)
Age: 73
End: 2023-04-17

## 2023-04-27 ENCOUNTER — APPOINTMENT (OUTPATIENT)
Dept: UROLOGY | Facility: CLINIC | Age: 73
End: 2023-04-27
Payer: COMMERCIAL

## 2023-04-27 VITALS
DIASTOLIC BLOOD PRESSURE: 90 MMHG | SYSTOLIC BLOOD PRESSURE: 161 MMHG | TEMPERATURE: 97.5 F | OXYGEN SATURATION: 97 % | HEART RATE: 73 BPM

## 2023-04-27 LAB
BILIRUB UR QL STRIP: NORMAL
CLARITY UR: CLEAR
COLLECTION METHOD: NORMAL
GLUCOSE UR-MCNC: NORMAL
HCG UR QL: 0.2 EU/DL
HGB UR QL STRIP.AUTO: NORMAL
KETONES UR-MCNC: ABNORMAL
LEUKOCYTE ESTERASE UR QL STRIP: NORMAL
NITRITE UR QL STRIP: NORMAL
PH UR STRIP: 5.5
PROT UR STRIP-MCNC: NORMAL
SP GR UR STRIP: 1.02

## 2023-04-27 PROCEDURE — 99213 OFFICE O/P EST LOW 20 MIN: CPT

## 2023-04-27 PROCEDURE — 81003 URINALYSIS AUTO W/O SCOPE: CPT | Mod: QW

## 2023-04-27 NOTE — HISTORY OF PRESENT ILLNESS
[FreeTextEntry1] : 3/15/21:\par 69 yo male with hx of fluctuating PSA. He had a negative TRUS biopsy many years ago (> 10 years).  He has been on finasteride for several years.  PSA was 4.8 in August 2020.  It has been as high as 5.86 in January 2020 with a 4K score of 10%.  He has 2 MRIs of the prostate , one in 2016 and one in 2019.  In 2016 he had a PIRADs 3 lesion, but he did not want to proceed with biopsy at that time.  The repeat MRI in 2019 did not show the same PIRADs 3 lesion. A separate lesion was seen which was scored as PIRADs 2.  The prostate is enlarged at 96 g per the 2019 MR.  The finasteride and tamsulosin has been helping with his LUTS.  He is content to continue with those medications.\par \par His most recent PSA from March 2021 is 3.8 with % Free PSA of 8%.\par \par *************************\par \par \par I contacted Mr. LYNDA MONTANA by telemedicine using the [employer] platform. The patient was located at his  home address in [place].  The appropriate consent was obtained prior to the conference.  The primary purpose of the meeting was to discuss his genitourinary symptoms.\par \par The patient-doctor relationship has been established in a face to face fashion via real time video/audio HIPAA compliant communication using telemedicine software. The patient's identity has been confirmed. The patient was previously emailed a copy of the telemedicine consent. They have had a chance to review and has now given verbal consent and has requested care to be assessed and treated through telemedicine and understands there maybe limitations in this process and they may need further follow up care in the office and or hospital settings.\par   .\par \par 8/6/21:\par Patient returns for follow up.  His PSA from 8/2/21 is 4.98.  It has been as high as 5.8.  With a hx of 1 prior negative TRUS Bx and an MRI from 2019 showing no PIRADs 3-5 lesions.  He is voiding at his baseline.  He continues to take finasteride and tamsulosin.  \par \par *****************\par 3/11/22:\par Patient returns for follow up.  His PSA is slightly lower than it was in August.  He has an MRI from 2019 showing no PIRADs 3-5 lesions.  HIs PSA has been stable.  He continues to take tamsulosin and BPH daily for his LUTS.  \par \par PSA (3/1/22): 4.83\par \par PVR = 8 cc\par \par ***************\par 11/30/22:\par Patient returns for follow up.  He is voiding at his baseline and his happy.  HIs PSA has been elevated for several years but stable.  He is s/p one negative TRUS biopsy in the past.  His last prostate MRI was in 2019. \par \par PSA (11/15/22) = 5.17\par \par ***************\par 4/27/23:\par Patient returns to discuss prostate MRI. MRI done on 4/15/23 showed a 82.2 cc prostate with a 8 mm PIRADs-3 lesion to left anterior apex with no EPE. His most recent PSA was 11/15/22 at 5.17 (on finasteride). PSAD is 0.12. \par \par UA: trace ketone\par \par \par \par \par

## 2023-04-27 NOTE — ASSESSMENT
[FreeTextEntry1] : 73 yo male with PIRADs 3 lesion and hx of elevated PSA.  We discussed options including continues PSA surveillance vs. targeted biopsy.  His PSAD is 0.12.  I explained that there is a 15-20% of malignancy in PIRADs 3 lesions.  \par  Risks of prostate biopsy were discussed including, but not limited to, bleeding, hematuria, infection, abscess, sepsis, rectal injury, urethral injury, or bladder injury, irritative or obstructive urinary symptoms, urinary retention, chronic pain, as well as the potential need for additional procedures.  The patient asked questions and they were answered. \par \par He is leaning towards proceeding with biopsy.  His wife is undergoing surgery next week and he will likely proceed with biopsy in about a month after his wife has recovered from surgery.  We will check his PSA today.\par \par Plan:\par 1. Likely will proceed with biopsy in June (PST and medical clearance at that time)\par 2. PSA today\par

## 2023-05-01 LAB
PSA FREE FLD-MCNC: 7 %
PSA FREE SERPL-MCNC: 0.39 NG/ML
PSA SERPL-MCNC: 5.53 NG/ML

## 2023-06-09 ENCOUNTER — RX RENEWAL (OUTPATIENT)
Age: 73
End: 2023-06-09

## 2023-09-14 ENCOUNTER — APPOINTMENT (OUTPATIENT)
Dept: PHARMACY | Facility: CLINIC | Age: 73
End: 2023-09-14
Payer: SELF-PAY

## 2023-09-14 ENCOUNTER — APPOINTMENT (OUTPATIENT)
Dept: PHARMACY | Facility: CLINIC | Age: 73
End: 2023-09-14
Payer: COMMERCIAL

## 2023-09-14 ENCOUNTER — APPOINTMENT (OUTPATIENT)
Dept: OTOLARYNGOLOGY | Facility: CLINIC | Age: 73
End: 2023-09-14
Payer: COMMERCIAL

## 2023-09-14 VITALS
DIASTOLIC BLOOD PRESSURE: 71 MMHG | TEMPERATURE: 97 F | SYSTOLIC BLOOD PRESSURE: 115 MMHG | WEIGHT: 170 LBS | OXYGEN SATURATION: 97 % | BODY MASS INDEX: 24.34 KG/M2 | HEART RATE: 80 BPM | HEIGHT: 70 IN

## 2023-09-14 DIAGNOSIS — H61.23 IMPACTED CERUMEN, BILATERAL: ICD-10-CM

## 2023-09-14 PROCEDURE — V5299A: CUSTOM

## 2023-09-14 PROCEDURE — 99212 OFFICE O/P EST SF 10 MIN: CPT

## 2023-09-27 ENCOUNTER — NON-APPOINTMENT (OUTPATIENT)
Age: 73
End: 2023-09-27

## 2023-09-28 ENCOUNTER — APPOINTMENT (OUTPATIENT)
Dept: UROLOGY | Facility: CLINIC | Age: 73
End: 2023-09-28
Payer: COMMERCIAL

## 2023-09-28 DIAGNOSIS — N40.0 BENIGN PROSTATIC HYPERPLASIA WITHOUT LOWER URINARY TRACT SYMPMS: ICD-10-CM

## 2023-09-28 DIAGNOSIS — R97.20 ELEVATED PROSTATE, SPECIFIC ANTIGEN [PSA]: ICD-10-CM

## 2023-09-28 PROCEDURE — 99214 OFFICE O/P EST MOD 30 MIN: CPT

## 2023-09-29 ENCOUNTER — APPOINTMENT (OUTPATIENT)
Dept: MRI IMAGING | Facility: CLINIC | Age: 73
End: 2023-09-29
Payer: COMMERCIAL

## 2023-09-29 ENCOUNTER — OUTPATIENT (OUTPATIENT)
Dept: OUTPATIENT SERVICES | Facility: HOSPITAL | Age: 73
LOS: 1 days | End: 2023-09-29

## 2023-09-29 LAB
ALBUMIN SERPL ELPH-MCNC: 4.9 G/DL
ALP BLD-CCNC: 58 U/L
ALT SERPL-CCNC: 27 U/L
ANION GAP SERPL CALC-SCNC: 12 MMOL/L
APTT BLD: 33 SEC
AST SERPL-CCNC: 18 U/L
BILIRUB SERPL-MCNC: 0.7 MG/DL
BUN SERPL-MCNC: 15 MG/DL
CALCIUM SERPL-MCNC: 9.7 MG/DL
CHLORIDE SERPL-SCNC: 103 MMOL/L
CO2 SERPL-SCNC: 28 MMOL/L
CREAT SERPL-MCNC: 0.93 MG/DL
EGFR: 87 ML/MIN/1.73M2
GLUCOSE SERPL-MCNC: 91 MG/DL
HCT VFR BLD CALC: 44.4 %
HGB BLD-MCNC: 14.2 G/DL
INR PPP: 0.98 RATIO
MCHC RBC-ENTMCNC: 31.9 PG
MCHC RBC-ENTMCNC: 32 GM/DL
MCV RBC AUTO: 99.8 FL
PLATELET # BLD AUTO: 205 K/UL
POTASSIUM SERPL-SCNC: 4.4 MMOL/L
PROT SERPL-MCNC: 7.1 G/DL
PSA SERPL-MCNC: 5.11 NG/ML
PT BLD: 11.1 SEC
RBC # BLD: 4.45 M/UL
RBC # FLD: 13.4 %
SODIUM SERPL-SCNC: 143 MMOL/L
WBC # FLD AUTO: 6.34 K/UL

## 2023-09-29 PROCEDURE — 76377 3D RENDER W/INTRP POSTPROCES: CPT | Mod: 26

## 2023-09-29 PROCEDURE — 72197 MRI PELVIS W/O & W/DYE: CPT | Mod: 26

## 2023-10-02 LAB — BACTERIA UR CULT: NORMAL

## 2023-10-03 ENCOUNTER — APPOINTMENT (OUTPATIENT)
Dept: PHARMACY | Facility: CLINIC | Age: 73
End: 2023-10-03
Payer: SELF-PAY

## 2023-10-03 PROCEDURE — V5299A: CUSTOM

## 2023-10-05 ENCOUNTER — NON-APPOINTMENT (OUTPATIENT)
Age: 73
End: 2023-10-05

## 2023-10-08 ENCOUNTER — RX RENEWAL (OUTPATIENT)
Age: 73
End: 2023-10-08

## 2023-10-08 RX ORDER — EZETIMIBE 10 MG/1
10 TABLET ORAL
Qty: 90 | Refills: 3 | Status: ACTIVE | COMMUNITY
Start: 2022-10-17 | End: 1900-01-01

## 2023-10-13 ENCOUNTER — APPOINTMENT (OUTPATIENT)
Dept: INTERNAL MEDICINE | Facility: CLINIC | Age: 73
End: 2023-10-13

## 2023-10-19 ENCOUNTER — APPOINTMENT (OUTPATIENT)
Dept: ENDOCRINOLOGY | Facility: CLINIC | Age: 73
End: 2023-10-19
Payer: COMMERCIAL

## 2023-10-19 VITALS
HEIGHT: 70 IN | DIASTOLIC BLOOD PRESSURE: 68 MMHG | OXYGEN SATURATION: 96 % | WEIGHT: 168.6 LBS | HEART RATE: 79 BPM | BODY MASS INDEX: 24.14 KG/M2 | TEMPERATURE: 97.5 F | SYSTOLIC BLOOD PRESSURE: 120 MMHG

## 2023-10-19 DIAGNOSIS — I10 ESSENTIAL (PRIMARY) HYPERTENSION: ICD-10-CM

## 2023-10-19 DIAGNOSIS — E78.00 PURE HYPERCHOLESTEROLEMIA, UNSPECIFIED: ICD-10-CM

## 2023-10-19 DIAGNOSIS — E04.2 NONTOXIC MULTINODULAR GOITER: ICD-10-CM

## 2023-10-19 DIAGNOSIS — R73.03 PREDIABETES.: ICD-10-CM

## 2023-10-19 PROCEDURE — 99214 OFFICE O/P EST MOD 30 MIN: CPT

## 2023-11-02 PROBLEM — I10 HYPERTENSION: Status: ACTIVE | Noted: 2019-11-15

## 2023-11-02 PROBLEM — E78.00 PURE HYPERCHOLESTEROLEMIA: Status: ACTIVE | Noted: 2017-09-21

## 2023-11-02 PROBLEM — R73.03 PRE-DIABETES: Status: ACTIVE | Noted: 2017-09-21

## 2023-11-02 PROBLEM — E04.2 NONTOXIC MULTINODULAR GOITER: Status: ACTIVE | Noted: 2018-03-26

## 2023-11-06 ENCOUNTER — APPOINTMENT (OUTPATIENT)
Dept: UROLOGY | Facility: AMBULATORY SURGERY CENTER | Age: 73
End: 2023-11-06

## 2023-11-13 ENCOUNTER — APPOINTMENT (OUTPATIENT)
Dept: ULTRASOUND IMAGING | Facility: CLINIC | Age: 73
End: 2023-11-13
Payer: COMMERCIAL

## 2023-11-13 ENCOUNTER — OUTPATIENT (OUTPATIENT)
Dept: OUTPATIENT SERVICES | Facility: HOSPITAL | Age: 73
LOS: 1 days | End: 2023-11-13

## 2023-11-13 PROCEDURE — 76536 US EXAM OF HEAD AND NECK: CPT | Mod: 26

## 2023-11-15 ENCOUNTER — NON-APPOINTMENT (OUTPATIENT)
Age: 73
End: 2023-11-15

## 2023-12-04 ENCOUNTER — RX RENEWAL (OUTPATIENT)
Age: 73
End: 2023-12-04

## 2023-12-04 RX ORDER — HYDROCHLOROTHIAZIDE 12.5 MG/1
12.5 CAPSULE ORAL
Qty: 90 | Refills: 3 | Status: ACTIVE | COMMUNITY
Start: 2020-01-21 | End: 1900-01-01

## 2024-01-16 ENCOUNTER — TRANSCRIPTION ENCOUNTER (OUTPATIENT)
Age: 74
End: 2024-01-16

## 2024-01-16 RX ORDER — FINASTERIDE 5 MG/1
5 TABLET, FILM COATED ORAL
Qty: 90 | Refills: 3 | Status: ACTIVE | COMMUNITY
Start: 2021-02-10 | End: 1900-01-01

## 2024-01-16 RX ORDER — SILDENAFIL 100 MG/1
100 TABLET, FILM COATED ORAL
Qty: 6 | Refills: 5 | Status: ACTIVE | COMMUNITY
Start: 2021-02-10 | End: 1900-01-01

## 2024-03-25 ENCOUNTER — RX RENEWAL (OUTPATIENT)
Age: 74
End: 2024-03-25

## 2024-03-25 RX ORDER — TAMSULOSIN HYDROCHLORIDE 0.4 MG/1
0.4 CAPSULE ORAL
Qty: 90 | Refills: 3 | Status: ACTIVE | COMMUNITY
Start: 2019-11-15 | End: 1900-01-01

## 2024-03-26 ENCOUNTER — APPOINTMENT (OUTPATIENT)
Dept: OTOLARYNGOLOGY | Facility: CLINIC | Age: 74
End: 2024-03-26
Payer: COMMERCIAL

## 2024-03-26 ENCOUNTER — APPOINTMENT (OUTPATIENT)
Dept: PHARMACY | Facility: CLINIC | Age: 74
End: 2024-03-26
Payer: COMMERCIAL

## 2024-03-26 VITALS
SYSTOLIC BLOOD PRESSURE: 124 MMHG | DIASTOLIC BLOOD PRESSURE: 76 MMHG | OXYGEN SATURATION: 98 % | HEIGHT: 70 IN | BODY MASS INDEX: 24.05 KG/M2 | WEIGHT: 168 LBS | TEMPERATURE: 97.9 F | HEART RATE: 68 BPM

## 2024-03-26 DIAGNOSIS — H61.23 IMPACTED CERUMEN, BILATERAL: ICD-10-CM

## 2024-03-26 DIAGNOSIS — H90.3 SENSORINEURAL HEARING LOSS, BILATERAL: ICD-10-CM

## 2024-03-26 PROCEDURE — 92557 COMPREHENSIVE HEARING TEST: CPT

## 2024-03-26 PROCEDURE — V5299A: CUSTOM

## 2024-03-26 PROCEDURE — 92550 TYMPANOMETRY & REFLEX THRESH: CPT | Mod: 52

## 2024-03-26 PROCEDURE — 69210 REMOVE IMPACTED EAR WAX UNI: CPT

## 2024-03-26 PROCEDURE — 99213 OFFICE O/P EST LOW 20 MIN: CPT | Mod: 25

## 2024-03-26 NOTE — ASSESSMENT
[FreeTextEntry1] : cerumen removed hearing improved  - no change - reassured adjustment as needed he asked if he could try something to remove cerumen at home - I recommended Debrox rtc 6 mo and as needed

## 2024-03-26 NOTE — HISTORY OF PRESENT ILLNESS
[de-identified] : 1 year follow up exam for this 73 yo m with h/o b snhl (cookie bite config). He reports a few weeks of progressive hl l>r and notices significant decrease. He is a ha user. No inciting event.

## 2024-03-26 NOTE — PHYSICAL EXAM
[de-identified] : b copious cerumen impaction removed atraumatically with suction [Normal] : no masses and lesions seen, face is symmetric [de-identified] : gait steady

## 2024-06-25 LAB — PSA SERPL-MCNC: 5.94 NG/ML

## 2024-06-26 LAB
PSA FREE FLD-MCNC: 18 %
PSA FREE SERPL-MCNC: 1.07 NG/ML
PSA SERPL-MCNC: 5.83 NG/ML

## 2024-07-08 ENCOUNTER — APPOINTMENT (OUTPATIENT)
Dept: UROLOGY | Facility: CLINIC | Age: 74
End: 2024-07-08
Payer: COMMERCIAL

## 2024-07-08 VITALS
BODY MASS INDEX: 24.05 KG/M2 | WEIGHT: 168 LBS | DIASTOLIC BLOOD PRESSURE: 72 MMHG | TEMPERATURE: 98.3 F | SYSTOLIC BLOOD PRESSURE: 148 MMHG | HEART RATE: 82 BPM | HEIGHT: 70 IN

## 2024-07-08 DIAGNOSIS — R97.20 ELEVATED PROSTATE, SPECIFIC ANTIGEN [PSA]: ICD-10-CM

## 2024-07-08 DIAGNOSIS — N40.0 BENIGN PROSTATIC HYPERPLASIA WITHOUT LOWER URINARY TRACT SYMPMS: ICD-10-CM

## 2024-07-08 PROCEDURE — G2211 COMPLEX E/M VISIT ADD ON: CPT | Mod: NC

## 2024-07-08 PROCEDURE — 99214 OFFICE O/P EST MOD 30 MIN: CPT

## 2024-08-28 ENCOUNTER — RX RENEWAL (OUTPATIENT)
Age: 74
End: 2024-08-28

## 2024-09-30 ENCOUNTER — NON-APPOINTMENT (OUTPATIENT)
Age: 74
End: 2024-09-30

## 2024-09-30 ENCOUNTER — APPOINTMENT (OUTPATIENT)
Dept: ENDOCRINOLOGY | Facility: CLINIC | Age: 74
End: 2024-09-30

## 2024-09-30 VITALS
SYSTOLIC BLOOD PRESSURE: 120 MMHG | BODY MASS INDEX: 24.39 KG/M2 | DIASTOLIC BLOOD PRESSURE: 66 MMHG | HEIGHT: 70 IN | WEIGHT: 170.4 LBS

## 2024-09-30 DIAGNOSIS — E78.00 PURE HYPERCHOLESTEROLEMIA, UNSPECIFIED: ICD-10-CM

## 2024-09-30 DIAGNOSIS — H90.3 SENSORINEURAL HEARING LOSS, BILATERAL: ICD-10-CM

## 2024-09-30 DIAGNOSIS — E04.2 NONTOXIC MULTINODULAR GOITER: ICD-10-CM

## 2024-09-30 DIAGNOSIS — R73.03 PREDIABETES.: ICD-10-CM

## 2024-09-30 DIAGNOSIS — I10 ESSENTIAL (PRIMARY) HYPERTENSION: ICD-10-CM

## 2024-09-30 DIAGNOSIS — I35.8 OTHER NONRHEUMATIC AORTIC VALVE DISORDERS: ICD-10-CM

## 2024-09-30 PROCEDURE — 99214 OFFICE O/P EST MOD 30 MIN: CPT

## 2024-09-30 PROCEDURE — G2211 COMPLEX E/M VISIT ADD ON: CPT | Mod: NC

## 2024-09-30 NOTE — PHYSICAL EXAM
[Alert] : alert [Well Nourished] : well nourished [Healthy Appearance] : healthy appearance [Normal Sclera/Conjunctiva] : normal sclera/conjunctiva [EOMI] : extra ocular movement intact [PERRL] : pupils equal, round and reactive to light [No Proptosis] : no proptosis [No Lid Lag] : no lid lag [Normal Outer Ear/Nose] : the ears and nose were normal in appearance [No Neck Mass] : no neck mass was observed [No LAD] : no lymphadenopathy [Thyroid Not Enlarged] : the thyroid was not enlarged [Clear to Auscultation] : lungs were clear to auscultation bilaterally [Normal Rate] : heart rate was normal [Regular Rhythm] : with a regular rhythm [Carotids Normal] : carotid pulses were normal with no bruits [No Edema] : no peripheral edema [Not Tender] : non-tender [Soft] : abdomen soft [No HSM] : no hepato-splenomegaly [Normal Supraclavicular Nodes] : no supraclavicular lymphadenopathy [Normal Anterior Cervical Nodes] : no anterior cervical lymphadenopathy [No CVA Tenderness] : no ~M costovertebral angle tenderness [Normal Gait] : normal gait [No Involuntary Movements] : no involuntary movements were seen [No Joint Swelling] : no joint swelling seen [Normal Strength/Tone] : muscle strength and tone were normal [No Rash] : no rash [No Tremors] : no tremors [Normal Sensation on Monofilament Testing] : normal sensation on monofilament testing of lower extremities [Normal Affect] : the affect was normal [Normal Mood] : the mood was normal [Kyphosis] : no kyphosis present [Acanthosis Nigricans] : no acanthosis nigricans [Foot Ulcers] : no foot ulcers [de-identified] : Mild corneal arcus [de-identified] : Hearing seems normal today with the hearing aids in place [de-identified] : Unable to feel a distinct thyroid nodule [de-identified] : Soft TIMUR at the LSB and aortic area [de-identified] : DP and PT pulses 2+ bilaterally [de-identified] : Moderate male-pattern hair loss [de-identified] : Vibratory sensation significantly decreased over the toes

## 2024-09-30 NOTE — HISTORY OF PRESENT ILLNESS
[FreeTextEntry1] : 71-year-old man who is followed for hypercholesterolemia, hypertension and pre-diabetes.  His other active medical problems include BPH and a multinodular goiter.  His past medical history is significant for nephrolithiasis and for an incidentally discovered left adrenal nodule (which was non-secretory by hormonal testing and stable in size on follow-up imaging).   He now returns for his yearly visit.  Has not had any significant medical issues since his last visit and has no new physical complaints. Interim history: --No acute illnesses --Seeing Dr. Sorensen every 6 months for  follow-up.  PSA remains mildly elevated but MRIs have been negative.  (Last MRI was in Sept of 2023).  His prostate is quite large ( grams).  He does not have any undue frequency during the day, and nocturia is no more than twice a night. --Had a colonoscopy in March which was positive for one polyp.  (He is not sure whether hyperplastic or adenomatous, but he was told to have a folow-up in 5 years) --Also had a full-body skin check --Received the updated COVID booster  D        Interim history: --Had a full-body skin check which was negative --Had a follow-up prostate MRI in April which identified a possible new lesion.  Decided to do a follow-up MRI rather than a biopsy.  The updated MRI was unable to identify that lesion, and only showed a lesion which has been present for years and was unchanged. The plan at this point is to simply repeat MRis every 6 months.   --Had a blepharitis in his left eye a week ago [place] BPs at home have been < 130/75 Weight is about the same: --Breakfast is usually cereal (Oatmeal or Shredded Wheat) with berries --Lunch is a sandwich or salad --Protein at supper is fish 2-3X/week, beef once a week, otherwise poultry.  Starches are briown rice, potatoes or pasta --Intake of baked goods is minimal.   --Has a beer before supper, and sometimes a glass of wine with supper.  Current diet: --Breakfast is cold cereal (usually Shredded Wheat and/or Wheatabix) with LF milk.  Has a small glass of OJ --Lunch is a salad or sandwich --Protein at supper is beef once a week, fish 1-2X/week, otherwise chicken.  Starches at supper are usually pasta or rice, occasionally.   --Intake of baked goods is minimal. Usually has fruit for dessert. BPs at home have been mostly 130-140/70-80     m history is significant only for a mild case of COVID last month--had mild nasal congestion and sore throat after returning from a trip. to [facility].  Rapid test was negative, but he had a positive PCR.  He did not take Paxlovid.   Still sees William Ham and the audiologist every 3-6 months to have cerumen removed.  Hearing aids have not been changed, but are working satisfactorily BPs checked at home have been < 140/75 Medications reviewed:  No recent changed. Current diet: --Breakfast is cold cereal (usually Shredded Wheat or Wheatabix) with LF milk.  Has a small glass of OJ --Lunch is a salad or sandwich --Protein at supper is beef once a week, fish 1-2X/week, otherwise chicken.  Starches at supper are usually pasta or rice.  Diet has has been adversely affected by the lack of gas in his building.   --Intake of baked goods is minimal    Has obtained hearing aids, and is very happy with them. Has also followed up with Dr. Sorensen regarding his elevated PSA, which has remained stable.  He will likely have a follow-up MRI of the prostate at the end of this year. He has no new physical complaints.   Has been splitting his time between [place] and his country house in [place] Received his (Moderna) COVID vaccine in Feb/March.  Had only mild fatigue after the second dose.  Intends to get the booster when it is approved. He has also received his flu shot and both pneumonia vaccines, as well as both doses of the Shingrix vaccine.. His weight is about the same. Continues to check his BPs at home, and nearly all are < 130-140/80. Current diet was reviewed: --Breakfast is usually Shredded Wheat with LF milk and either banana or berries. --Lunch is a sandwich --Protein at supper is beef once a week, fish 2-3X/week, otherwise poultry.  Starches are brown rice, WW pasta or potato. --Will have an occasional muffin or Turkish in the morning

## 2024-09-30 NOTE — ASSESSMENT
[0] : 2) Feeling down, depressed, or hopeless: Not at all (0) [PHQ-2 Negative - No further assessment needed] : PHQ-2 Negative - No further assessment needed [FreeTextEntry1] : 1) Hyperlipidemia:  LDL-cholesterol level was below his target of 70 mg%, with the target dictated by his aortic sclerosis, which should probably be regarded as an "atherosclerotic equivalent."  His diet is quite low in saturated fat. --Continue atorvastatin 40 mg/day plus Zetia  2) Hypertension:  BP is excellent at today's visit, though he has lapsed on his monitoring at home. --Continue lisinopril 5 mg BID plus HCTZ --Resumption of home BP monitoring (once a week) was encouraged  3) Pre-diabetes:  FBS and A1c level are below the IFG/pre-diabetes range despite his slight weight gain. --Diet was reviewed, but is excellent at this point, and no obvious source of excessive calories can be identified --Increased physical activity (mostly walking) was encouraged  4) Multinodular goiter:  Ultrasound in 2023 showed three nodules--two in the R lobe (6 mm and 9 mm) one in the left lobe (3 mm).  The dominant nodule on the right was negative on biopsy in 2017.  Current TFTs are in the euthyroid range.   With all three nodules < 1 cm and none showing any suspicious sonographic characteristics, will defer the next ultrasound for another year --Continue to follow yearly TFTs --Ultrasound in 2025  5) Aortic sclerosis:  Last echocardiogram was in 2021 and showed sclerosis without significant stenosis.  Systolic murmur seems about the same and he is asymptomatic, but will get an updated echo next year.

## 2024-09-30 NOTE — REVIEW OF SYSTEMS
[Dry Eyes] : dryness [Nocturia] : nocturia [Fatigue] : no fatigue [Decreased Appetite] : appetite not decreased [Fever] : no fever [Chills] : no chills [Blurred Vision] : no blurred vision [Eyes Itch] : no itch [Dysphagia] : no dysphagia [Chest Pain] : no chest pain [Palpitations] : no palpitations [Lower Ext Edema] : no lower extremity edema [Shortness Of Breath] : no shortness of breath [Cough] : no cough [Wheezing] : no wheezing [SOB on Exertion] : no shortness of breath on exertion [Nausea] : no nausea [Constipation] : no constipation [Heartburn] : no heartburn [Diarrhea] : no diarrhea [Polyuria] : no polyuria [Dysuria] : no dysuria [Joint Pain] : no joint pain [Muscle Weakness] : no muscle weakness [Myalgia] : no myalgia  [Joint Stiffness] : no joint stiffness [Muscle Cramps] : no muscle cramps [Dry Skin] : no dry skin [Hair Loss] : no hair loss [Ulcer] : no ulcer [Headaches] : no headaches [Tremors] : no tremors [Pain/Numbness of Digits] : no pain/numbness of digits [Depression] : no depression [Insomnia] : no insomnia [Anxiety] : no anxiety [Stress] : no stress [Polydipsia] : no polydipsia [Cold Intolerance] : no cold intolerance [Heat Intolerance] : no heat intolerance [Easy Bleeding] : no ~M tendency for easy bleeding [Easy Bruising] : no tendency for easy bruising [FreeTextEntry2] : Has gained another 2 lb since his last visit  [FreeTextEntry3] : Gets ophth exams every 6 months.  Has early cataracts which do not impair his vision [FreeTextEntry4] : Has had an excellent result from the hearing aids, but needs to have his ears cleaned every 6 months [FreeTextEntry7] : Colonoscopy in March was positive for one polyp [FreeTextEntry8] : See comments in the HPI re:  follow-up.  No flank pain or hematuria. Has nocturia 2X/night, no undue frequency during the day [de-identified] : Goes for yearly skin checks

## 2024-10-01 ENCOUNTER — APPOINTMENT (OUTPATIENT)
Dept: OTOLARYNGOLOGY | Facility: CLINIC | Age: 74
End: 2024-10-01
Payer: COMMERCIAL

## 2024-10-01 VITALS
BODY MASS INDEX: 24.34 KG/M2 | TEMPERATURE: 98 F | DIASTOLIC BLOOD PRESSURE: 61 MMHG | SYSTOLIC BLOOD PRESSURE: 115 MMHG | HEIGHT: 70 IN | OXYGEN SATURATION: 99 % | HEART RATE: 75 BPM | WEIGHT: 170 LBS

## 2024-10-01 DIAGNOSIS — H90.3 SENSORINEURAL HEARING LOSS, BILATERAL: ICD-10-CM

## 2024-10-01 DIAGNOSIS — H61.23 IMPACTED CERUMEN, BILATERAL: ICD-10-CM

## 2024-10-01 PROCEDURE — 99212 OFFICE O/P EST SF 10 MIN: CPT

## 2024-10-01 NOTE — HISTORY OF PRESENT ILLNESS
[de-identified] : 6 month follow up visit for this 73 y/o M with h/o b snhl (cookie bite configuration). He wears hearing aids. He is c/o b aural fullness over the past few months.

## 2024-10-01 NOTE — ASSESSMENT
[FreeTextEntry1] : 1. b cerumen impaction -cerumen removed -ears felt better with hearing aids  2. b snhl -continue with hearing aids -rpt  in 6 months RTC in 6 months; call sooner for any issues

## 2024-10-01 NOTE — PHYSICAL EXAM
[Normal] : external ears are normal bilaterally [de-identified] : b copious cerumen removed atraumatically with suction, b tms nl [de-identified] : gait steady

## 2024-11-21 ENCOUNTER — RX RENEWAL (OUTPATIENT)
Age: 74
End: 2024-11-21

## 2024-11-25 ENCOUNTER — RX RENEWAL (OUTPATIENT)
Age: 74
End: 2024-11-25

## 2025-02-06 ENCOUNTER — NON-APPOINTMENT (OUTPATIENT)
Age: 75
End: 2025-02-06

## 2025-02-20 LAB
PSA FREE FLD-MCNC: 9 %
PSA FREE SERPL-MCNC: 0.4 NG/ML
PSA SERPL-MCNC: 4.43 NG/ML

## 2025-02-21 ENCOUNTER — RX RENEWAL (OUTPATIENT)
Age: 75
End: 2025-02-21

## 2025-02-25 ENCOUNTER — APPOINTMENT (OUTPATIENT)
Dept: UROLOGY | Facility: CLINIC | Age: 75
End: 2025-02-25
Payer: COMMERCIAL

## 2025-02-25 ENCOUNTER — NON-APPOINTMENT (OUTPATIENT)
Age: 75
End: 2025-02-25

## 2025-02-25 VITALS
HEIGHT: 70 IN | TEMPERATURE: 97.2 F | WEIGHT: 170 LBS | HEART RATE: 76 BPM | BODY MASS INDEX: 24.34 KG/M2 | SYSTOLIC BLOOD PRESSURE: 129 MMHG | DIASTOLIC BLOOD PRESSURE: 75 MMHG

## 2025-02-25 DIAGNOSIS — R97.20 ELEVATED PROSTATE, SPECIFIC ANTIGEN [PSA]: ICD-10-CM

## 2025-02-25 DIAGNOSIS — N40.0 BENIGN PROSTATIC HYPERPLASIA WITHOUT LOWER URINARY TRACT SYMPMS: ICD-10-CM

## 2025-02-25 PROCEDURE — G2211 COMPLEX E/M VISIT ADD ON: CPT | Mod: NC

## 2025-02-25 PROCEDURE — 99213 OFFICE O/P EST LOW 20 MIN: CPT

## 2025-04-02 ENCOUNTER — APPOINTMENT (OUTPATIENT)
Dept: OTOLARYNGOLOGY | Facility: CLINIC | Age: 75
End: 2025-04-02
Payer: COMMERCIAL

## 2025-04-02 VITALS
DIASTOLIC BLOOD PRESSURE: 74 MMHG | HEART RATE: 67 BPM | SYSTOLIC BLOOD PRESSURE: 151 MMHG | BODY MASS INDEX: 24.34 KG/M2 | OXYGEN SATURATION: 100 % | TEMPERATURE: 98.6 F | HEIGHT: 70 IN | WEIGHT: 170 LBS

## 2025-04-02 DIAGNOSIS — H90.3 SENSORINEURAL HEARING LOSS, BILATERAL: ICD-10-CM

## 2025-04-02 DIAGNOSIS — H61.23 IMPACTED CERUMEN, BILATERAL: ICD-10-CM

## 2025-04-02 DIAGNOSIS — Z86.79 PERSONAL HISTORY OF OTHER DISEASES OF THE CIRCULATORY SYSTEM: ICD-10-CM

## 2025-04-02 PROCEDURE — 99213 OFFICE O/P EST LOW 20 MIN: CPT | Mod: 25

## 2025-04-02 PROCEDURE — 92567 TYMPANOMETRY: CPT

## 2025-04-02 PROCEDURE — G0268 REMOVAL OF IMPACTED WAX MD: CPT

## 2025-04-02 PROCEDURE — 92557 COMPREHENSIVE HEARING TEST: CPT

## 2025-08-19 ENCOUNTER — RX RENEWAL (OUTPATIENT)
Age: 75
End: 2025-08-19

## 2025-09-16 ENCOUNTER — APPOINTMENT (OUTPATIENT)
Dept: UROLOGY | Facility: CLINIC | Age: 75
End: 2025-09-16
Payer: COMMERCIAL

## 2025-09-16 VITALS
DIASTOLIC BLOOD PRESSURE: 68 MMHG | HEART RATE: 82 BPM | SYSTOLIC BLOOD PRESSURE: 134 MMHG | HEIGHT: 70 IN | WEIGHT: 170 LBS | BODY MASS INDEX: 24.34 KG/M2 | TEMPERATURE: 97.9 F

## 2025-09-16 DIAGNOSIS — R30.0 DYSURIA: ICD-10-CM

## 2025-09-16 DIAGNOSIS — N40.1 BENIGN PROSTATIC HYPERPLASIA WITH LOWER URINARY TRACT SYMPMS: ICD-10-CM

## 2025-09-16 DIAGNOSIS — N13.8 BENIGN PROSTATIC HYPERPLASIA WITH LOWER URINARY TRACT SYMPMS: ICD-10-CM

## 2025-09-16 PROCEDURE — 99214 OFFICE O/P EST MOD 30 MIN: CPT

## 2025-09-16 PROCEDURE — G2211 COMPLEX E/M VISIT ADD ON: CPT | Mod: NC

## 2025-09-16 PROCEDURE — 51798 US URINE CAPACITY MEASURE: CPT

## 2025-09-16 RX ORDER — CIPROFLOXACIN HYDROCHLORIDE 500 MG/1
500 TABLET, FILM COATED ORAL
Qty: 14 | Refills: 4 | Status: ACTIVE | COMMUNITY
Start: 2025-09-16 | End: 1900-01-01

## 2025-09-17 LAB
APPEARANCE: ABNORMAL
BACTERIA: ABNORMAL /HPF
BILIRUBIN URINE: NEGATIVE
BLOOD URINE: ABNORMAL
CAST: 3 /LPF
COLOR: YELLOW
EPITHELIAL CELLS: 1 /HPF
GLUCOSE QUALITATIVE U: NEGATIVE MG/DL
HYALINE CASTS: PRESENT
KETONES URINE: NEGATIVE MG/DL
LEUKOCYTE ESTERASE URINE: ABNORMAL
MICROSCOPIC-UA: NORMAL
MUCUS: PRESENT
NITRITE URINE: NEGATIVE
PH URINE: 6
PROTEIN URINE: 30 MG/DL
RED BLOOD CELLS URINE: 12 /HPF
REVIEW: NORMAL
SPECIFIC GRAVITY URINE: 1.02
UROBILINOGEN URINE: 0.2 MG/DL
WHITE BLOOD CELLS URINE: 249 /HPF

## 2025-09-19 ENCOUNTER — NON-APPOINTMENT (OUTPATIENT)
Age: 75
End: 2025-09-19

## 2025-09-19 LAB — BACTERIA UR CULT: ABNORMAL

## 2025-09-20 ENCOUNTER — APPOINTMENT (OUTPATIENT)
Dept: HEART AND VASCULAR | Facility: CLINIC | Age: 75
End: 2025-09-20
Payer: COMMERCIAL

## 2025-09-20 DIAGNOSIS — I35.8 OTHER NONRHEUMATIC AORTIC VALVE DISORDERS: ICD-10-CM

## 2025-09-20 PROCEDURE — 93306 TTE W/DOPPLER COMPLETE: CPT
